# Patient Record
Sex: FEMALE | URBAN - METROPOLITAN AREA
[De-identification: names, ages, dates, MRNs, and addresses within clinical notes are randomized per-mention and may not be internally consistent; named-entity substitution may affect disease eponyms.]

---

## 2017-03-23 ENCOUNTER — EMERGENCY (EMERGENCY)
Facility: HOSPITAL | Age: 42
LOS: 0 days | Discharge: HOME | End: 2017-03-23
Attending: EMERGENCY MEDICINE | Admitting: EMERGENCY MEDICINE
Payer: COMMERCIAL

## 2017-03-23 VITALS — HEIGHT: 60 IN | WEIGHT: 111.99 LBS

## 2017-03-23 VITALS
TEMPERATURE: 98 F | HEART RATE: 65 BPM | OXYGEN SATURATION: 100 % | SYSTOLIC BLOOD PRESSURE: 106 MMHG | DIASTOLIC BLOOD PRESSURE: 56 MMHG | RESPIRATION RATE: 16 BRPM

## 2017-03-23 DIAGNOSIS — R10.31 RIGHT LOWER QUADRANT PAIN: ICD-10-CM

## 2017-03-23 DIAGNOSIS — N83.202 UNSPECIFIED OVARIAN CYST, LEFT SIDE: ICD-10-CM

## 2017-03-23 DIAGNOSIS — N83.201 UNSPECIFIED OVARIAN CYST, RIGHT SIDE: ICD-10-CM

## 2017-03-23 LAB
ALBUMIN SERPL ELPH-MCNC: 4 G/DL — SIGNIFICANT CHANGE UP (ref 3.3–5)
ALP SERPL-CCNC: 78 U/L — SIGNIFICANT CHANGE UP (ref 40–120)
ALT FLD-CCNC: 23 U/L — SIGNIFICANT CHANGE UP (ref 12–78)
ANION GAP SERPL CALC-SCNC: 9 MMOL/L — SIGNIFICANT CHANGE UP (ref 5–17)
APPEARANCE UR: CLEAR — SIGNIFICANT CHANGE UP
AST SERPL-CCNC: 24 U/L — SIGNIFICANT CHANGE UP (ref 15–37)
BASOPHILS # BLD AUTO: 0.1 K/UL — SIGNIFICANT CHANGE UP (ref 0–0.2)
BASOPHILS NFR BLD AUTO: 1.3 % — SIGNIFICANT CHANGE UP (ref 0–2)
BILIRUB SERPL-MCNC: 0.2 MG/DL — SIGNIFICANT CHANGE UP (ref 0.2–1.2)
BILIRUB UR-MCNC: NEGATIVE — SIGNIFICANT CHANGE UP
BUN SERPL-MCNC: 10 MG/DL — SIGNIFICANT CHANGE UP (ref 7–23)
CALCIUM SERPL-MCNC: 8.5 MG/DL — SIGNIFICANT CHANGE UP (ref 8.5–10.1)
CHLORIDE SERPL-SCNC: 106 MMOL/L — SIGNIFICANT CHANGE UP (ref 96–108)
CO2 SERPL-SCNC: 26 MMOL/L — SIGNIFICANT CHANGE UP (ref 22–31)
COLOR SPEC: YELLOW — SIGNIFICANT CHANGE UP
CREAT SERPL-MCNC: 0.65 MG/DL — SIGNIFICANT CHANGE UP (ref 0.5–1.3)
DIFF PNL FLD: NEGATIVE — SIGNIFICANT CHANGE UP
EOSINOPHIL # BLD AUTO: 0.3 K/UL — SIGNIFICANT CHANGE UP (ref 0–0.5)
EOSINOPHIL NFR BLD AUTO: 3.6 % — SIGNIFICANT CHANGE UP (ref 0–6)
GLUCOSE SERPL-MCNC: 84 MG/DL — SIGNIFICANT CHANGE UP (ref 70–99)
GLUCOSE UR QL: NEGATIVE MG/DL — SIGNIFICANT CHANGE UP
HCG SERPL-ACNC: <1 MIU/ML — SIGNIFICANT CHANGE UP
HCT VFR BLD CALC: 40.3 % — SIGNIFICANT CHANGE UP (ref 34.5–45)
HGB BLD-MCNC: 13.5 G/DL — SIGNIFICANT CHANGE UP (ref 11.5–15.5)
KETONES UR-MCNC: NEGATIVE — SIGNIFICANT CHANGE UP
LEUKOCYTE ESTERASE UR-ACNC: NEGATIVE — SIGNIFICANT CHANGE UP
LIDOCAIN IGE QN: 156 U/L — SIGNIFICANT CHANGE UP (ref 73–393)
LYMPHOCYTES # BLD AUTO: 2.4 K/UL — SIGNIFICANT CHANGE UP (ref 1–3.3)
LYMPHOCYTES # BLD AUTO: 30.2 % — SIGNIFICANT CHANGE UP (ref 13–44)
MCHC RBC-ENTMCNC: 29.9 PG — SIGNIFICANT CHANGE UP (ref 27–34)
MCHC RBC-ENTMCNC: 33.5 GM/DL — SIGNIFICANT CHANGE UP (ref 32–36)
MCV RBC AUTO: 89.4 FL — SIGNIFICANT CHANGE UP (ref 80–100)
MONOCYTES # BLD AUTO: 0.6 K/UL — SIGNIFICANT CHANGE UP (ref 0–0.9)
MONOCYTES NFR BLD AUTO: 8 % — SIGNIFICANT CHANGE UP (ref 2–14)
NEUTROPHILS # BLD AUTO: 4.5 K/UL — SIGNIFICANT CHANGE UP (ref 1.8–7.4)
NEUTROPHILS NFR BLD AUTO: 56.9 % — SIGNIFICANT CHANGE UP (ref 43–77)
NITRITE UR-MCNC: NEGATIVE — SIGNIFICANT CHANGE UP
PH UR: 8 — SIGNIFICANT CHANGE UP (ref 4.8–8)
PLATELET # BLD AUTO: 352 K/UL — SIGNIFICANT CHANGE UP (ref 150–400)
POTASSIUM SERPL-MCNC: 4.1 MMOL/L — SIGNIFICANT CHANGE UP (ref 3.5–5.3)
POTASSIUM SERPL-SCNC: 4.1 MMOL/L — SIGNIFICANT CHANGE UP (ref 3.5–5.3)
PROT SERPL-MCNC: 7.3 GM/DL — SIGNIFICANT CHANGE UP (ref 6–8.3)
PROT UR-MCNC: NEGATIVE MG/DL — SIGNIFICANT CHANGE UP
RBC # BLD: 4.51 M/UL — SIGNIFICANT CHANGE UP (ref 3.8–5.2)
RBC # FLD: 12.3 % — SIGNIFICANT CHANGE UP (ref 10.3–14.5)
SODIUM SERPL-SCNC: 141 MMOL/L — SIGNIFICANT CHANGE UP (ref 135–145)
SP GR SPEC: 1.01 — SIGNIFICANT CHANGE UP (ref 1.01–1.02)
UROBILINOGEN FLD QL: NEGATIVE MG/DL — SIGNIFICANT CHANGE UP
WBC # BLD: 7.8 K/UL — SIGNIFICANT CHANGE UP (ref 3.8–10.5)
WBC # FLD AUTO: 7.8 K/UL — SIGNIFICANT CHANGE UP (ref 3.8–10.5)

## 2017-03-23 PROCEDURE — 74177 CT ABD & PELVIS W/CONTRAST: CPT | Mod: 26

## 2017-03-23 PROCEDURE — 76830 TRANSVAGINAL US NON-OB: CPT | Mod: 26

## 2017-03-23 PROCEDURE — 99284 EMERGENCY DEPT VISIT MOD MDM: CPT

## 2017-03-23 RX ORDER — SODIUM CHLORIDE 9 MG/ML
1000 INJECTION INTRAMUSCULAR; INTRAVENOUS; SUBCUTANEOUS ONCE
Qty: 0 | Refills: 0 | Status: COMPLETED | OUTPATIENT
Start: 2017-03-23 | End: 2017-03-23

## 2017-03-23 RX ADMIN — SODIUM CHLORIDE 1000 MILLILITER(S): 9 INJECTION INTRAMUSCULAR; INTRAVENOUS; SUBCUTANEOUS at 16:23

## 2017-03-23 NOTE — ED STATDOCS - OBJECTIVE STATEMENT
40 y/o F with a PMHx hysterectomy presents to the ED c/o RLQ pain that started 2 days ago and is worse today. Pt states that she has been feeling tired more often, currently calm and denies NVD, EtOH, smoking, CP, SOB, HA, fever or any other acute c/o at this time.

## 2017-03-23 NOTE — ED STATDOCS - CARE PLAN
Principal Discharge DX:	Cysts of both ovaries  Secondary Diagnosis:	Abdominal pain, unspecified location

## 2017-03-23 NOTE — ED STATDOCS - ATTENDING CONTRIBUTION TO CARE
I, Elvira Martinez MD,  performed the initial face to face bedside interview with this patient regarding history of present illness, review of symptoms and relevant past medical, social and family history.  I completed an independent physical examination.  I was the initial provider who evaluated this patient. I have signed out the follow up of any pending tests (i.e. labs, radiological studies) to the ACP.  I have communicated the patient’s plan of care and disposition with the ACP.  The history, relevant review of systems, past medical and surgical history, medical decision making, and physical examination was documented by the scribe in my presence and I attest to the accuracy of the documentation.

## 2017-03-23 NOTE — ED STATDOCS - PROGRESS NOTE DETAILS
41 yr. old female presents to ED with RLQ pain onset 2 days ago worsened today 41 yr. old female presents to ED with RLQ pain onset 2 days ago worsened today radiates to back 41 yr. old female presents to ED with RLQ pain onset 2 days ago worsened today radiates to back, dysuria No fever or chills. No nausea or vomiting. No diarrhea. HEENT PERRLA EOMS intact, Neck; non tender no lymphadenopathy, Chest; non tender Lungs clear, Abd: tender RLQ  BS +   Plan: IV ,fluids, Labs CT abd./pelvis , UA. Will F/U with Labs and CT results and re evaluate. Brock ALONZO. Ultrasound shws good flow to both ovaries. Will d/c pt. - Jefferson Keenan PA-C

## 2017-03-23 NOTE — ED STATDOCS - NS ED MD SCRIBE ATTENDING SCRIBE SECTIONS
HISTORY OF PRESENT ILLNESS/DISPOSITION/PAST MEDICAL/SURGICAL/SOCIAL HISTORY/PHYSICAL EXAM/REVIEW OF SYSTEMS/RESULTS/PROGRESS NOTE

## 2017-03-27 ENCOUNTER — APPOINTMENT (OUTPATIENT)
Dept: OBGYN | Facility: CLINIC | Age: 42
End: 2017-03-27

## 2017-03-27 VITALS
DIASTOLIC BLOOD PRESSURE: 60 MMHG | WEIGHT: 156 LBS | HEIGHT: 63 IN | SYSTOLIC BLOOD PRESSURE: 100 MMHG | BODY MASS INDEX: 27.64 KG/M2

## 2017-03-27 DIAGNOSIS — N83.299 OTHER OVARIAN CYST, UNSPECIFIED SIDE: ICD-10-CM

## 2017-03-27 LAB
ESTRADIOL SERPL-MCNC: 75 PG/ML
FSH SERPL-MCNC: 7.3 IU/L

## 2017-03-28 LAB
CANDIDA VAG CYTO: NOT DETECTED
G VAGINALIS+PREV SP MTYP VAG QL MICRO: NOT DETECTED
T VAGINALIS VAG QL WET PREP: NOT DETECTED

## 2017-03-29 LAB
C TRACH RRNA SPEC QL NAA+PROBE: NORMAL
N GONORRHOEA RRNA SPEC QL NAA+PROBE: NORMAL
SOURCE AMPLIFICATION: NORMAL

## 2017-04-09 ENCOUNTER — FORM ENCOUNTER (OUTPATIENT)
Age: 42
End: 2017-04-09

## 2017-04-10 ENCOUNTER — APPOINTMENT (OUTPATIENT)
Dept: ULTRASOUND IMAGING | Facility: CLINIC | Age: 42
End: 2017-04-10

## 2017-04-10 ENCOUNTER — OUTPATIENT (OUTPATIENT)
Dept: OUTPATIENT SERVICES | Facility: HOSPITAL | Age: 42
LOS: 1 days | End: 2017-04-10
Payer: COMMERCIAL

## 2017-04-10 DIAGNOSIS — Z00.8 ENCOUNTER FOR OTHER GENERAL EXAMINATION: ICD-10-CM

## 2017-04-10 PROCEDURE — 76830 TRANSVAGINAL US NON-OB: CPT

## 2017-04-10 PROCEDURE — 76856 US EXAM PELVIC COMPLETE: CPT

## 2017-04-24 ENCOUNTER — APPOINTMENT (OUTPATIENT)
Dept: OBGYN | Facility: CLINIC | Age: 42
End: 2017-04-24

## 2017-05-08 ENCOUNTER — APPOINTMENT (OUTPATIENT)
Dept: OBGYN | Facility: CLINIC | Age: 42
End: 2017-05-08

## 2018-04-16 ENCOUNTER — APPOINTMENT (OUTPATIENT)
Dept: OBGYN | Facility: CLINIC | Age: 43
End: 2018-04-16
Payer: COMMERCIAL

## 2018-04-16 VITALS
SYSTOLIC BLOOD PRESSURE: 100 MMHG | WEIGHT: 156 LBS | DIASTOLIC BLOOD PRESSURE: 68 MMHG | BODY MASS INDEX: 27.64 KG/M2 | HEIGHT: 63 IN

## 2018-04-16 DIAGNOSIS — Z01.419 ENCOUNTER FOR GYNECOLOGICAL EXAMINATION (GENERAL) (ROUTINE) W/OUT ABNORMAL FINDINGS: ICD-10-CM

## 2018-04-16 DIAGNOSIS — Z87.42 PERSONAL HISTORY OF OTHER DISEASES OF THE FEMALE GENITAL TRACT: ICD-10-CM

## 2018-04-16 DIAGNOSIS — Z86.018 PERSONAL HISTORY OF OTHER BENIGN NEOPLASM: ICD-10-CM

## 2018-04-16 PROCEDURE — 99396 PREV VISIT EST AGE 40-64: CPT

## 2018-04-17 PROBLEM — Z86.018 HISTORY OF UTERINE LEIOMYOMA: Status: RESOLVED | Noted: 2018-04-17 | Resolved: 2018-04-17

## 2018-04-17 PROBLEM — Z87.42 HISTORY OF OVARIAN CYST: Status: RESOLVED | Noted: 2018-04-17 | Resolved: 2018-04-17

## 2018-04-18 LAB
APPEARANCE: CLEAR
BACTERIA UR CULT: NORMAL
BILIRUBIN URINE: NEGATIVE
BLOOD URINE: NEGATIVE
COLOR: YELLOW
GLUCOSE QUALITATIVE U: NEGATIVE MG/DL
HPV HIGH+LOW RISK DNA PNL CVX: NOT DETECTED
KETONES URINE: NEGATIVE
LEUKOCYTE ESTERASE URINE: NEGATIVE
NITRITE URINE: NEGATIVE
PH URINE: 6.5
PROTEIN URINE: NEGATIVE MG/DL
SPECIFIC GRAVITY URINE: 1.01
UROBILINOGEN URINE: NEGATIVE MG/DL

## 2018-04-19 LAB — CYTOLOGY CVX/VAG DOC THIN PREP: NORMAL

## 2018-05-14 ENCOUNTER — APPOINTMENT (OUTPATIENT)
Dept: OBGYN | Facility: CLINIC | Age: 43
End: 2018-05-14
Payer: COMMERCIAL

## 2018-05-14 VITALS
SYSTOLIC BLOOD PRESSURE: 108 MMHG | HEIGHT: 63 IN | BODY MASS INDEX: 27.64 KG/M2 | DIASTOLIC BLOOD PRESSURE: 70 MMHG | WEIGHT: 156 LBS

## 2018-05-14 DIAGNOSIS — N83.202 UNSPECIFIED OVARIAN CYST, RIGHT SIDE: ICD-10-CM

## 2018-05-14 DIAGNOSIS — N83.201 UNSPECIFIED OVARIAN CYST, RIGHT SIDE: ICD-10-CM

## 2018-05-14 PROCEDURE — 99213 OFFICE O/P EST LOW 20 MIN: CPT

## 2018-07-22 ENCOUNTER — EMERGENCY (EMERGENCY)
Facility: HOSPITAL | Age: 43
LOS: 0 days | Discharge: HOME | End: 2018-07-22
Attending: STUDENT IN AN ORGANIZED HEALTH CARE EDUCATION/TRAINING PROGRAM | Admitting: STUDENT IN AN ORGANIZED HEALTH CARE EDUCATION/TRAINING PROGRAM
Payer: COMMERCIAL

## 2018-07-22 VITALS
DIASTOLIC BLOOD PRESSURE: 42 MMHG | HEART RATE: 74 BPM | OXYGEN SATURATION: 99 % | TEMPERATURE: 98 F | SYSTOLIC BLOOD PRESSURE: 116 MMHG | RESPIRATION RATE: 16 BRPM

## 2018-07-22 VITALS — WEIGHT: 153 LBS

## 2018-07-22 DIAGNOSIS — Z90.710 ACQUIRED ABSENCE OF BOTH CERVIX AND UTERUS: Chronic | ICD-10-CM

## 2018-07-22 DIAGNOSIS — R51 HEADACHE: ICD-10-CM

## 2018-07-22 DIAGNOSIS — R07.9 CHEST PAIN, UNSPECIFIED: ICD-10-CM

## 2018-07-22 DIAGNOSIS — R06.02 SHORTNESS OF BREATH: ICD-10-CM

## 2018-07-22 DIAGNOSIS — Z98.890 OTHER SPECIFIED POSTPROCEDURAL STATES: ICD-10-CM

## 2018-07-22 DIAGNOSIS — Z90.710 ACQUIRED ABSENCE OF BOTH CERVIX AND UTERUS: ICD-10-CM

## 2018-07-22 LAB
ALBUMIN SERPL ELPH-MCNC: 3.9 G/DL — SIGNIFICANT CHANGE UP (ref 3.3–5)
ALP SERPL-CCNC: 74 U/L — SIGNIFICANT CHANGE UP (ref 40–120)
ALT FLD-CCNC: 32 U/L — SIGNIFICANT CHANGE UP (ref 12–78)
ANION GAP SERPL CALC-SCNC: 6 MMOL/L — SIGNIFICANT CHANGE UP (ref 5–17)
AST SERPL-CCNC: 22 U/L — SIGNIFICANT CHANGE UP (ref 15–37)
BASOPHILS # BLD AUTO: 0.05 K/UL — SIGNIFICANT CHANGE UP (ref 0–0.2)
BASOPHILS NFR BLD AUTO: 0.9 % — SIGNIFICANT CHANGE UP (ref 0–2)
BILIRUB SERPL-MCNC: 0.4 MG/DL — SIGNIFICANT CHANGE UP (ref 0.2–1.2)
BUN SERPL-MCNC: 10 MG/DL — SIGNIFICANT CHANGE UP (ref 7–23)
CALCIUM SERPL-MCNC: 8.6 MG/DL — SIGNIFICANT CHANGE UP (ref 8.5–10.1)
CHLORIDE SERPL-SCNC: 108 MMOL/L — SIGNIFICANT CHANGE UP (ref 96–108)
CK SERPL-CCNC: 121 U/L — SIGNIFICANT CHANGE UP (ref 26–192)
CO2 SERPL-SCNC: 27 MMOL/L — SIGNIFICANT CHANGE UP (ref 22–31)
CREAT SERPL-MCNC: 0.8 MG/DL — SIGNIFICANT CHANGE UP (ref 0.5–1.3)
EOSINOPHIL # BLD AUTO: 0.18 K/UL — SIGNIFICANT CHANGE UP (ref 0–0.5)
EOSINOPHIL NFR BLD AUTO: 3.3 % — SIGNIFICANT CHANGE UP (ref 0–6)
GLUCOSE SERPL-MCNC: 95 MG/DL — SIGNIFICANT CHANGE UP (ref 70–99)
HCT VFR BLD CALC: 40.8 % — SIGNIFICANT CHANGE UP (ref 34.5–45)
HGB BLD-MCNC: 13.6 G/DL — SIGNIFICANT CHANGE UP (ref 11.5–15.5)
IMM GRANULOCYTES NFR BLD AUTO: 0.7 % — SIGNIFICANT CHANGE UP (ref 0–1.5)
LIDOCAIN IGE QN: 149 U/L — SIGNIFICANT CHANGE UP (ref 73–393)
LYMPHOCYTES # BLD AUTO: 1.76 K/UL — SIGNIFICANT CHANGE UP (ref 1–3.3)
LYMPHOCYTES # BLD AUTO: 32.4 % — SIGNIFICANT CHANGE UP (ref 13–44)
MCHC RBC-ENTMCNC: 29.8 PG — SIGNIFICANT CHANGE UP (ref 27–34)
MCHC RBC-ENTMCNC: 33.3 GM/DL — SIGNIFICANT CHANGE UP (ref 32–36)
MCV RBC AUTO: 89.3 FL — SIGNIFICANT CHANGE UP (ref 80–100)
MONOCYTES # BLD AUTO: 0.41 K/UL — SIGNIFICANT CHANGE UP (ref 0–0.9)
MONOCYTES NFR BLD AUTO: 7.5 % — SIGNIFICANT CHANGE UP (ref 2–14)
NEUTROPHILS # BLD AUTO: 3 K/UL — SIGNIFICANT CHANGE UP (ref 1.8–7.4)
NEUTROPHILS NFR BLD AUTO: 55.2 % — SIGNIFICANT CHANGE UP (ref 43–77)
NRBC # BLD: 0 /100 WBCS — SIGNIFICANT CHANGE UP (ref 0–0)
PLATELET # BLD AUTO: 298 K/UL — SIGNIFICANT CHANGE UP (ref 150–400)
POTASSIUM SERPL-MCNC: 4 MMOL/L — SIGNIFICANT CHANGE UP (ref 3.5–5.3)
POTASSIUM SERPL-SCNC: 4 MMOL/L — SIGNIFICANT CHANGE UP (ref 3.5–5.3)
PROT SERPL-MCNC: 7.2 GM/DL — SIGNIFICANT CHANGE UP (ref 6–8.3)
RBC # BLD: 4.57 M/UL — SIGNIFICANT CHANGE UP (ref 3.8–5.2)
RBC # FLD: 12.8 % — SIGNIFICANT CHANGE UP (ref 10.3–14.5)
SODIUM SERPL-SCNC: 141 MMOL/L — SIGNIFICANT CHANGE UP (ref 135–145)
TROPONIN I SERPL-MCNC: <0.015 NG/ML — SIGNIFICANT CHANGE UP (ref 0.01–0.04)
TROPONIN I SERPL-MCNC: <0.015 NG/ML — SIGNIFICANT CHANGE UP (ref 0.01–0.04)
WBC # BLD: 5.44 K/UL — SIGNIFICANT CHANGE UP (ref 3.8–10.5)
WBC # FLD AUTO: 5.44 K/UL — SIGNIFICANT CHANGE UP (ref 3.8–10.5)

## 2018-07-22 PROCEDURE — 71046 X-RAY EXAM CHEST 2 VIEWS: CPT | Mod: 26

## 2018-07-22 PROCEDURE — 93010 ELECTROCARDIOGRAM REPORT: CPT

## 2018-07-22 PROCEDURE — 99284 EMERGENCY DEPT VISIT MOD MDM: CPT

## 2018-07-22 RX ORDER — ASPIRIN/CALCIUM CARB/MAGNESIUM 324 MG
325 TABLET ORAL ONCE
Qty: 0 | Refills: 0 | Status: COMPLETED | OUTPATIENT
Start: 2018-07-22 | End: 2018-07-22

## 2018-07-22 RX ORDER — SODIUM CHLORIDE 9 MG/ML
3 INJECTION INTRAMUSCULAR; INTRAVENOUS; SUBCUTANEOUS ONCE
Qty: 0 | Refills: 0 | Status: COMPLETED | OUTPATIENT
Start: 2018-07-22 | End: 2018-07-22

## 2018-07-22 RX ADMIN — SODIUM CHLORIDE 3 MILLILITER(S): 9 INJECTION INTRAMUSCULAR; INTRAVENOUS; SUBCUTANEOUS at 12:29

## 2018-07-22 RX ADMIN — Medication 325 MILLIGRAM(S): at 12:30

## 2018-07-22 NOTE — ED STATDOCS - PROGRESS NOTE DETAILS
43 yr. old female PMH: Hysterectomy presents to ED with intermittent left chest pain and SOB onset 3 days ago worse on taking a deep breath. Also has headache. Non smoker no recent travel not on any hormones. Seen and examined by attending in Intake. Plan: IV, Labs, Trop Xs 2, Chest X-Ray, EKG.  Will F/U with results and re evaluate. Brock ALONZO Kehinde DO: Patient feels better at this time; 2 troponins negative; instructed to f/u with pmd, cards in 1-2 days without fail; strict return precautions given.

## 2018-07-22 NOTE — ED STATDOCS - OBJECTIVE STATEMENT
44 y/o female with a PMHx of hysterectomy presents to the ED c/o chest pain. Pt's daughter at bedside states symptoms started 3 days ago with SOB and CP that radiates to neck. Pt also reports HA. Denies history of similar symptoms in the past. Symptoms are intermittent. At time of eval, pt reports pressure in chest, fatigue, and HA. Pt has history of migraines but they are becoming more frequent and are beginning to cause blurry vision as well. Took ibuprofen last night for symptoms without relief. Denies hormone/estrogen prescriptions at this time. No smoking, no FHx of heart disease. 42 y/o female with a PMHx of hysterectomy presents to the ED c/o chest pain. Pt's daughter at bedside states symptoms started 3 days ago with SOB and CP that radiates to neck. Pt also reports HA. Denies history of similar symptoms in the past. Symptoms are intermittent. At time of eval, pt reports pressure in chest, fatigue, and HA. Pt has history of migraines ; Took ibuprofen last night for symptoms without relief. Denies hormone/estrogen prescriptions at this time. No smoking, no FHx of heart disease.

## 2018-07-22 NOTE — ED STATDOCS - ATTENDING CONTRIBUTION TO CARE
I, Jessica Busby DO,  performed the initial face to face bedside interview with this patient regarding history of present illness, review of symptoms and relevant past medical, social and family history.  I completed an independent physical examination.  I was the initial provider who evaluated this patient. I have signed out the follow up of any pending tests (i.e. labs, radiological studies) to the ACP.  I have communicated the patient’s plan of care and disposition with the ACP.  The history, relevant review of systems, past medical and surgical history, medical decision making, and physical examination was documented by the scribe in my presence and I attest to the accuracy of the documentation.

## 2019-02-21 ENCOUNTER — APPOINTMENT (OUTPATIENT)
Dept: OBGYN | Facility: CLINIC | Age: 44
End: 2019-02-21
Payer: COMMERCIAL

## 2019-02-21 PROCEDURE — 99214 OFFICE O/P EST MOD 30 MIN: CPT

## 2019-02-22 NOTE — PHYSICAL EXAM
[Soft, Nontender] : the abdomen was soft and nontender [Firm] : not firm [Rigid] : not rigid [Rebound] : no rebound [Guarding] : no guarding [Oriented x3] : oriented to person, place, and time [Depressed Mood] : not depressed [Normal] : external genitalia [Discharge] : no discharge [Absent] : absent [Adnexa Tenderness] : were not tender

## 2019-02-24 ENCOUNTER — FORM ENCOUNTER (OUTPATIENT)
Age: 44
End: 2019-02-24

## 2019-02-25 ENCOUNTER — OUTPATIENT (OUTPATIENT)
Dept: OUTPATIENT SERVICES | Facility: HOSPITAL | Age: 44
LOS: 1 days | End: 2019-02-25
Payer: COMMERCIAL

## 2019-02-25 ENCOUNTER — APPOINTMENT (OUTPATIENT)
Dept: ULTRASOUND IMAGING | Facility: CLINIC | Age: 44
End: 2019-02-25
Payer: COMMERCIAL

## 2019-02-25 DIAGNOSIS — Z00.8 ENCOUNTER FOR OTHER GENERAL EXAMINATION: ICD-10-CM

## 2019-02-25 DIAGNOSIS — Z90.710 ACQUIRED ABSENCE OF BOTH CERVIX AND UTERUS: Chronic | ICD-10-CM

## 2019-02-25 LAB
APPEARANCE: CLEAR
BACTERIA UR CULT: NORMAL
BACTERIA: NEGATIVE
BILIRUBIN URINE: NEGATIVE
BLOOD URINE: NEGATIVE
CANDIDA VAG CYTO: NOT DETECTED
COLOR: COLORLESS
G VAGINALIS+PREV SP MTYP VAG QL MICRO: NOT DETECTED
GLUCOSE QUALITATIVE U: NEGATIVE
HYALINE CASTS: 1 /LPF
KETONES URINE: NEGATIVE
LEUKOCYTE ESTERASE URINE: NEGATIVE
MICROSCOPIC-UA: NORMAL
NITRITE URINE: NEGATIVE
PH URINE: 7.5
PROTEIN URINE: NEGATIVE
RED BLOOD CELLS URINE: 8 /HPF
SPECIFIC GRAVITY URINE: 1.01
SQUAMOUS EPITHELIAL CELLS: 3 /HPF
T VAGINALIS VAG QL WET PREP: NOT DETECTED
UROBILINOGEN URINE: NORMAL
WHITE BLOOD CELLS URINE: 0 /HPF

## 2019-02-25 PROCEDURE — 76830 TRANSVAGINAL US NON-OB: CPT

## 2019-02-25 PROCEDURE — 76856 US EXAM PELVIC COMPLETE: CPT | Mod: 26

## 2019-02-25 PROCEDURE — 76856 US EXAM PELVIC COMPLETE: CPT

## 2019-02-25 PROCEDURE — 76830 TRANSVAGINAL US NON-OB: CPT | Mod: 26

## 2019-02-26 ENCOUNTER — RESULT REVIEW (OUTPATIENT)
Age: 44
End: 2019-02-26

## 2019-02-28 ENCOUNTER — CLINICAL ADVICE (OUTPATIENT)
Age: 44
End: 2019-02-28

## 2019-03-14 ENCOUNTER — APPOINTMENT (OUTPATIENT)
Dept: NEUROLOGY | Facility: CLINIC | Age: 44
End: 2019-03-14
Payer: COMMERCIAL

## 2019-03-14 VITALS
SYSTOLIC BLOOD PRESSURE: 108 MMHG | BODY MASS INDEX: 27.64 KG/M2 | DIASTOLIC BLOOD PRESSURE: 68 MMHG | HEIGHT: 63 IN | WEIGHT: 156 LBS | HEART RATE: 73 BPM

## 2019-03-14 DIAGNOSIS — Z86.69 PERSONAL HISTORY OF OTHER DISEASES OF THE NERVOUS SYSTEM AND SENSE ORGANS: ICD-10-CM

## 2019-03-14 DIAGNOSIS — S16.1XXA STRAIN OF MUSCLE, FASCIA AND TENDON AT NECK LEVEL, INITIAL ENCOUNTER: ICD-10-CM

## 2019-03-14 PROCEDURE — 99244 OFF/OP CNSLTJ NEW/EST MOD 40: CPT

## 2019-03-14 NOTE — DISCUSSION/SUMMARY
[FreeTextEntry1] : 43-year-old female with PMH remarkable for arthritis and headaches since past 15 years; presents with increasing frequency of headaches associated with periorbital pain, neck pain and occasional dizziness.\par \par # Migraine headaches without aura, not intractable; frequency - 10 per month \par \par - Have advised patient to maintain a headache log, note to triggers frequency and pattern.\par - Patient unable to take NSAIDs as she has developed gastric irritation; since headache frequency is 10 per month, she may benefit from prophylactic therapy, patient is willing to try nortriptyline 10 mg h.s., adverse effects discussed with the patient\par - I advised patient regarding lifestyle change, proper eating habits, avoid dehydration and sleep deprivation\par - If needed. later may consider MRI brain, at this time not deemed necessary\par \par # Cervical strain/cervicalgia\par \par - Start physical therapy for the neck\par \par # Positive SOTO and arthritis\par \par - Follow up with Dr. Johnson, the rheumatologist\par \par

## 2019-03-14 NOTE — PHYSICAL EXAM
[General Appearance - Alert] : alert [General Appearance - In No Acute Distress] : in no acute distress [Oriented To Time, Place, And Person] : oriented to person, place, and time [Impaired Insight] : insight and judgment were intact [Affect] : the affect was normal [Person] : oriented to person [Place] : oriented to place [Time] : oriented to time [Concentration Intact] : normal concentrating ability [Visual Intact] : visual attention was ~T not ~L decreased [Naming Objects] : no difficulty naming common objects [Repeating Phrases] : no difficulty repeating a phrase [Writing A Sentence] : no difficulty writing a sentence [Fluency] : fluency intact [Comprehension] : comprehension intact [Reading] : reading intact [Past History] : adequate knowledge of personal past history [Cranial Nerves Optic (II)] : visual acuity intact bilaterally,  visual fields full to confrontation, pupils equal round and reactive to light [Cranial Nerves Oculomotor (III)] : extraocular motion intact [Cranial Nerves Trigeminal (V)] : facial sensation intact symmetrically [Cranial Nerves Facial (VII)] : face symmetrical [Cranial Nerves Vestibulocochlear (VIII)] : hearing was intact bilaterally [Cranial Nerves Glossopharyngeal (IX)] : tongue and palate midline [Cranial Nerves Accessory (XI - Cranial And Spinal)] : head turning and shoulder shrug symmetric [Cranial Nerves Hypoglossal (XII)] : there was no tongue deviation with protrusion [Motor Tone] : muscle tone was normal in all four extremities [Motor Strength] : muscle strength was normal in all four extremities [No Muscle Atrophy] : normal bulk in all four extremities [Sensation Tactile Decrease] : light touch was intact [Proprioception] : proprioception was intact [Abnormal Walk] : normal gait [Balance] : balance was intact [2+] : Ankle jerk left 2+ [PERRL With Normal Accommodation] : pupils were equal in size, round, reactive to light, with normal accommodation [Extraocular Movements] : extraocular movements were intact [Full Visual Field] : full visual field [Outer Ear] : the ears and nose were normal in appearance [Hearing Threshold Finger Rub Not [place]] : hearing was normal [Auscultation Breath Sounds / Voice Sounds] : lungs were clear to auscultation bilaterally [Heart Rate And Rhythm] : heart rate was normal and rhythm regular [Heart Sounds] : normal S1 and S2 [Arterial Pulses Carotid] : carotid pulses were normal with no bruits [Edema] : there was no peripheral edema [No Spinal Tenderness] : no spinal tenderness [Involuntary Movements] : no involuntary movements were seen [] : no rash [Past-pointing] : there was no past-pointing [Tremor] : no tremor present [Plantar Reflex Right Only] : normal on the right [Plantar Reflex Left Only] : normal on the left [FreeTextEntry1] : arthritis small joints of hands

## 2019-03-14 NOTE — PHYSICAL EXAM
[General Appearance - Alert] : alert [General Appearance - In No Acute Distress] : in no acute distress [Oriented To Time, Place, And Person] : oriented to person, place, and time [Impaired Insight] : insight and judgment were intact [Affect] : the affect was normal [Person] : oriented to person [Place] : oriented to place [Time] : oriented to time [Concentration Intact] : normal concentrating ability [Visual Intact] : visual attention was ~T not ~L decreased [Naming Objects] : no difficulty naming common objects [Repeating Phrases] : no difficulty repeating a phrase [Writing A Sentence] : no difficulty writing a sentence [Fluency] : fluency intact [Comprehension] : comprehension intact [Reading] : reading intact [Past History] : adequate knowledge of personal past history [Cranial Nerves Optic (II)] : visual acuity intact bilaterally,  visual fields full to confrontation, pupils equal round and reactive to light [Cranial Nerves Oculomotor (III)] : extraocular motion intact [Cranial Nerves Trigeminal (V)] : facial sensation intact symmetrically [Cranial Nerves Facial (VII)] : face symmetrical [Cranial Nerves Vestibulocochlear (VIII)] : hearing was intact bilaterally [Cranial Nerves Glossopharyngeal (IX)] : tongue and palate midline [Cranial Nerves Accessory (XI - Cranial And Spinal)] : head turning and shoulder shrug symmetric [Cranial Nerves Hypoglossal (XII)] : there was no tongue deviation with protrusion [Motor Tone] : muscle tone was normal in all four extremities [Motor Strength] : muscle strength was normal in all four extremities [No Muscle Atrophy] : normal bulk in all four extremities [Sensation Tactile Decrease] : light touch was intact [Proprioception] : proprioception was intact [Abnormal Walk] : normal gait [Balance] : balance was intact [2+] : Ankle jerk left 2+ [PERRL With Normal Accommodation] : pupils were equal in size, round, reactive to light, with normal accommodation [Extraocular Movements] : extraocular movements were intact [Full Visual Field] : full visual field [Outer Ear] : the ears and nose were normal in appearance [Hearing Threshold Finger Rub Not [place]] : hearing was normal [Auscultation Breath Sounds / Voice Sounds] : lungs were clear to auscultation bilaterally [Heart Rate And Rhythm] : heart rate was normal and rhythm regular [Heart Sounds] : normal S1 and S2 [Arterial Pulses Carotid] : carotid pulses were normal with no bruits [Edema] : there was no peripheral edema [No Spinal Tenderness] : no spinal tenderness [Involuntary Movements] : no involuntary movements were seen [] : no rash [Past-pointing] : there was no past-pointing [Tremor] : no tremor present [Plantar Reflex Right Only] : normal on the right [Plantar Reflex Left Only] : normal on the left [FreeTextEntry1] : arthritis small joints of hands

## 2019-03-14 NOTE — REVIEW OF SYSTEMS
[As Noted in HPI] : as noted in HPI [Migraine Headache] : migraine headaches [Heartburn] : heartburn [Negative] : Heme/Lymph [FreeTextEntry9] : neck pain

## 2019-03-14 NOTE — HISTORY OF PRESENT ILLNESS
[FreeTextEntry1] : 43-year-old female with past medical history remarkable for arthritis and headaches; presents today for evaluation of increasing frequency of headaches associated with periorbital pain.\par \par Patient states that she started experiencing headaches about 15 years ago, headaches are frontal and suboccipital in location, usually associated with photophobia, mild nausea and throbbing character, every now and then headaches are associated with severe perioperative pain. Patient states that the usual duration of headaches is few hours to a day, headaches usually start in the mornings, at times wake her up at night from sleep.\par \par Headaches were usually associated with menstrual periods to start with, patient underwent hysterectomy 6 years ago, since then headaches have been coming at random; at times headaches come daily  for 6-7 days, at times to 3 days ago, most recently she had headache that lasted a whole week; more recently the headache frequency is 10 per month. Patient denies visual aura, associated paresthesias/vertigo or tinnitus, she does admit every once in a while she feels slightly dizzy. \par \par The patient has been using NSAIDs to obtain relief, she was started on Gabapentin by P.M.D., she has been taking 100 mg sporadically. She also had blood work done, SOTO was elevated, she was referred to a rheumatologist Dr. Johnson, she has a followup appointment soon, the patient has complaints of diffuse joint pains including swelling and pain in the small joints of hands.\par \par

## 2019-03-14 NOTE — REASON FOR VISIT
[Consultation] : a consultation visit [FreeTextEntry1] : Referred by Dr. Hermosillo for evaluation of headaches

## 2019-03-19 ENCOUNTER — EMERGENCY (EMERGENCY)
Facility: HOSPITAL | Age: 44
LOS: 0 days | Discharge: HOME | End: 2019-03-19
Attending: EMERGENCY MEDICINE | Admitting: EMERGENCY MEDICINE
Payer: COMMERCIAL

## 2019-03-19 VITALS
OXYGEN SATURATION: 100 % | TEMPERATURE: 99 F | HEART RATE: 66 BPM | DIASTOLIC BLOOD PRESSURE: 47 MMHG | RESPIRATION RATE: 16 BRPM | SYSTOLIC BLOOD PRESSURE: 119 MMHG

## 2019-03-19 VITALS — HEIGHT: 62 IN | WEIGHT: 158.07 LBS

## 2019-03-19 DIAGNOSIS — Z90.710 ACQUIRED ABSENCE OF BOTH CERVIX AND UTERUS: Chronic | ICD-10-CM

## 2019-03-19 DIAGNOSIS — R07.9 CHEST PAIN, UNSPECIFIED: ICD-10-CM

## 2019-03-19 LAB
ALBUMIN SERPL ELPH-MCNC: 3.9 G/DL — SIGNIFICANT CHANGE UP (ref 3.3–5)
ALP SERPL-CCNC: 90 U/L — SIGNIFICANT CHANGE UP (ref 40–120)
ALT FLD-CCNC: 26 U/L — SIGNIFICANT CHANGE UP (ref 12–78)
ANION GAP SERPL CALC-SCNC: 8 MMOL/L — SIGNIFICANT CHANGE UP (ref 5–17)
AST SERPL-CCNC: 17 U/L — SIGNIFICANT CHANGE UP (ref 15–37)
BASOPHILS # BLD AUTO: 0.05 K/UL — SIGNIFICANT CHANGE UP (ref 0–0.2)
BASOPHILS NFR BLD AUTO: 0.6 % — SIGNIFICANT CHANGE UP (ref 0–2)
BILIRUB SERPL-MCNC: 0.3 MG/DL — SIGNIFICANT CHANGE UP (ref 0.2–1.2)
BUN SERPL-MCNC: 12 MG/DL — SIGNIFICANT CHANGE UP (ref 7–23)
CALCIUM SERPL-MCNC: 8.8 MG/DL — SIGNIFICANT CHANGE UP (ref 8.5–10.1)
CHLORIDE SERPL-SCNC: 106 MMOL/L — SIGNIFICANT CHANGE UP (ref 96–108)
CO2 SERPL-SCNC: 25 MMOL/L — SIGNIFICANT CHANGE UP (ref 22–31)
CREAT SERPL-MCNC: 0.9 MG/DL — SIGNIFICANT CHANGE UP (ref 0.5–1.3)
D DIMER BLD IA.RAPID-MCNC: <150 NG/ML DDU — SIGNIFICANT CHANGE UP
EOSINOPHIL # BLD AUTO: 0.14 K/UL — SIGNIFICANT CHANGE UP (ref 0–0.5)
EOSINOPHIL NFR BLD AUTO: 1.7 % — SIGNIFICANT CHANGE UP (ref 0–6)
GLUCOSE SERPL-MCNC: 78 MG/DL — SIGNIFICANT CHANGE UP (ref 70–99)
HCT VFR BLD CALC: 41.8 % — SIGNIFICANT CHANGE UP (ref 34.5–45)
HGB BLD-MCNC: 14.3 G/DL — SIGNIFICANT CHANGE UP (ref 11.5–15.5)
IMM GRANULOCYTES NFR BLD AUTO: 0.2 % — SIGNIFICANT CHANGE UP (ref 0–1.5)
LYMPHOCYTES # BLD AUTO: 2.19 K/UL — SIGNIFICANT CHANGE UP (ref 1–3.3)
LYMPHOCYTES # BLD AUTO: 26.4 % — SIGNIFICANT CHANGE UP (ref 13–44)
MCHC RBC-ENTMCNC: 30.2 PG — SIGNIFICANT CHANGE UP (ref 27–34)
MCHC RBC-ENTMCNC: 34.2 GM/DL — SIGNIFICANT CHANGE UP (ref 32–36)
MCV RBC AUTO: 88.4 FL — SIGNIFICANT CHANGE UP (ref 80–100)
MONOCYTES # BLD AUTO: 0.58 K/UL — SIGNIFICANT CHANGE UP (ref 0–0.9)
MONOCYTES NFR BLD AUTO: 7 % — SIGNIFICANT CHANGE UP (ref 2–14)
NEUTROPHILS # BLD AUTO: 5.32 K/UL — SIGNIFICANT CHANGE UP (ref 1.8–7.4)
NEUTROPHILS NFR BLD AUTO: 64.1 % — SIGNIFICANT CHANGE UP (ref 43–77)
NRBC # BLD: 0 /100 WBCS — SIGNIFICANT CHANGE UP (ref 0–0)
PLATELET # BLD AUTO: 323 K/UL — SIGNIFICANT CHANGE UP (ref 150–400)
POTASSIUM SERPL-MCNC: 3.8 MMOL/L — SIGNIFICANT CHANGE UP (ref 3.5–5.3)
POTASSIUM SERPL-SCNC: 3.8 MMOL/L — SIGNIFICANT CHANGE UP (ref 3.5–5.3)
PROT SERPL-MCNC: 7.6 GM/DL — SIGNIFICANT CHANGE UP (ref 6–8.3)
RBC # BLD: 4.73 M/UL — SIGNIFICANT CHANGE UP (ref 3.8–5.2)
RBC # FLD: 13 % — SIGNIFICANT CHANGE UP (ref 10.3–14.5)
SODIUM SERPL-SCNC: 139 MMOL/L — SIGNIFICANT CHANGE UP (ref 135–145)
TROPONIN I SERPL-MCNC: <0.015 NG/ML — SIGNIFICANT CHANGE UP (ref 0.01–0.04)
TROPONIN I SERPL-MCNC: <0.015 NG/ML — SIGNIFICANT CHANGE UP (ref 0.01–0.04)
WBC # BLD: 8.3 K/UL — SIGNIFICANT CHANGE UP (ref 3.8–10.5)
WBC # FLD AUTO: 8.3 K/UL — SIGNIFICANT CHANGE UP (ref 3.8–10.5)

## 2019-03-19 PROCEDURE — 93010 ELECTROCARDIOGRAM REPORT: CPT

## 2019-03-19 PROCEDURE — 71046 X-RAY EXAM CHEST 2 VIEWS: CPT | Mod: 26

## 2019-03-19 PROCEDURE — 99284 EMERGENCY DEPT VISIT MOD MDM: CPT

## 2019-03-19 NOTE — ED STATDOCS - OBJECTIVE STATEMENT
44 y/o female with no relevant PMHx presents to the ED from urgent care c/o sudden onset left sided CP yesterday, lasting half an hour. Pt drank water and CP resolved. Today pt had "left arm heaviness" and so came into ED. +SOB. Denies vomiting, diarrhea. Denies heavy lifting, injury, or trauma. Pt took ASA PTA. Denies tobacco use, EtOH use, illicit drug use. Pt does not get menstrual periods anymore because she had a hysterectomy in 2012. PMD- Dr. Hermosillo.  #[id number].

## 2019-03-19 NOTE — ED STATDOCS - PROGRESS NOTE DETAILS
42 y/o female with no relevant PMHx presents to the ED from urgent care c/o sudden onset left sided CP yesterday, lasting half an hour. Pt drank water and CP resolved. Today pt had "left arm heaviness" and so came into ED. +SOB. Denies vomiting, diarrhea. Denies heavy lifting, injury, or trauma. Pt took ASA PTA. Denies tobacco use, EtOH use, illicit drug use. -Jordan Hollis PA-C Results reviewed and discussed with pt. Pt with a heart score of 0, can be safely discharged at this time. Discussed importance of close FU with PMD.  Pt asked to return to ED immediately for any new or concerning sx or worsening sx. Pt acknowledges and understands plan. -Jordan Hollis PA-C

## 2019-03-19 NOTE — ED ADULT NURSE NOTE - DISCHARGE DATE/TIME
Medication Requested:    ALPRAZolam (XANAX) 0.5 MG tablet        Sig - Route: Take 0.5 mg by mouth every morning. - Oral    Class: Historical Med          Last Office Visit:  5/9/18    Next Office Visit:  6/12/18    Last Refill:  5/1/18- patient states she is taking one tablet by mouth in morning and one tablet by mouth in evening, medication routed to Dr. Crystal for approval      Medication refill request routed to provider for refill approval     19-Mar-2019 20:46

## 2019-03-19 NOTE — ED ADULT TRIAGE NOTE - CHIEF COMPLAINT QUOTE
pt sent from MD office for CP. Pt states chest pain started yesterday, now radiating down left arm, and feeling a burning sensation to left side of head. Denies Nausea, vomiting, dizziness.

## 2019-03-19 NOTE — ED STATDOCS - CLINICAL SUMMARY MEDICAL DECISION MAKING FREE TEXT BOX
44 y/o female with left sided CP Plan labs, x-ray. 44 y/o female with left sided CP Plan labs, x-ray.  Labs unremarkable, pt feeling well, stable for dc.

## 2019-06-04 ENCOUNTER — APPOINTMENT (OUTPATIENT)
Dept: NEUROLOGY | Facility: CLINIC | Age: 44
End: 2019-06-04

## 2019-08-26 ENCOUNTER — NON-APPOINTMENT (OUTPATIENT)
Age: 44
End: 2019-08-26

## 2019-08-26 ENCOUNTER — APPOINTMENT (OUTPATIENT)
Dept: FAMILY MEDICINE | Facility: CLINIC | Age: 44
End: 2019-08-26
Payer: COMMERCIAL

## 2019-08-26 VITALS
HEART RATE: 72 BPM | RESPIRATION RATE: 16 BRPM | SYSTOLIC BLOOD PRESSURE: 122 MMHG | HEIGHT: 63 IN | DIASTOLIC BLOOD PRESSURE: 70 MMHG | BODY MASS INDEX: 28.35 KG/M2 | WEIGHT: 160 LBS | OXYGEN SATURATION: 98 %

## 2019-08-26 DIAGNOSIS — K64.9 UNSPECIFIED HEMORRHOIDS: ICD-10-CM

## 2019-08-26 PROCEDURE — 93000 ELECTROCARDIOGRAM COMPLETE: CPT

## 2019-08-26 PROCEDURE — 99386 PREV VISIT NEW AGE 40-64: CPT | Mod: 25

## 2019-08-26 PROCEDURE — G0442 ANNUAL ALCOHOL SCREEN 15 MIN: CPT

## 2019-08-26 PROCEDURE — G0444 DEPRESSION SCREEN ANNUAL: CPT

## 2019-08-26 NOTE — ASSESSMENT
[FreeTextEntry1] : Annual\par Chronic headaches: advised compliance with medication.\par Not taking Nortriptyline\par Did not go for Neck PT as advised Rx given.\par GYN UTD\par

## 2019-08-26 NOTE — HEALTH RISK ASSESSMENT
[Very Good] : ~his/her~ current health as very good [No] : In the past 12 months have you used drugs other than those required for medical reasons? No [No falls in past year] : Patient reported no falls in the past year [0] : 1) Little interest or pleasure doing things: Not at all (0) [1] : 2) Feeling down, depressed, or hopeless for several days (1) [Patient reported mammogram was normal] : Patient reported mammogram was normal [Patient reported PAP Smear was normal] : Patient reported PAP Smear was normal [HIV Test offered] : HIV Test offered [None] : None [With Family] : lives with family [] :  [Employed] : employed [Sexually Active] : sexually active [Feels Safe at Home] : Feels safe at home [Smoke Detector] : smoke detector [Carbon Monoxide Detector] : carbon monoxide detector [Seat Belt] :  uses seat belt [Sunscreen] : uses sunscreen [Patient/Caregiver unclear of wishes] : Patient/Caregiver unclear of wishes [] : No [[id number]] : 1 [Change in mental status noted] : No change in mental status noted [Reports changes in hearing] : Reports no changes in hearing [Reports changes in vision] : Reports no changes in vision [Reports changes in dental health] : Reports no changes in dental health [TB Exposure] : is not being exposed to tuberculosis [MammogramDate] : 4/2018 [PapSmearDate] : 4/2018 [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

## 2019-08-26 NOTE — REVIEW OF SYSTEMS
[Joint Pain] : joint pain [Joint Stiffness] : joint stiffness [Muscle Pain] : muscle pain [Negative] : Heme/Lymph [Joint Swelling] : no joint swelling [Muscle Weakness] : no muscle weakness [Back Pain] : no back pain

## 2019-08-26 NOTE — HISTORY OF PRESENT ILLNESS
[FreeTextEntry1] : establish care/ Annual [de-identified] : 44 yr old female is here to establish care.\par Hx of Sjogren`s.\par Pain on defecation for 1 week. Reports she has hemorrhoids.\par Hx of chronic headaches, seen by Neurologist Dr Whiteside 3/19. Not taking the Nortriptyline as advised.\par

## 2019-08-26 NOTE — COUNSELING
[Potential consequences of obesity discussed] : Potential consequences of obesity discussed [Encouraged to use exercise tracking device] : Encouraged to use exercise tracking device [Weigh Self Weekly] : weigh self weekly [Benefits of weight loss discussed] : Benefits of weight loss discussed [Decrease Portions] : decrease portions [____ min/wk Activity] : [unfilled] min/wk activity [Good understanding] : Patient has a good understanding of disease, goals and obesity follow-up plan

## 2019-08-26 NOTE — PHYSICAL EXAM
[Normal] : no carotid or abdominal bruits heard, no varicosities, pedal pulses are present, no peripheral edema, no extremity clubbing or cyanosis and no palpable aorta [Normal Anterior Cervical Nodes] : no anterior cervical lymphadenopathy [No Spinal Tenderness] : no spinal tenderness [No CVA Tenderness] : no CVA  tenderness [No Joint Swelling] : no joint swelling [No Rash] : no rash [Grossly Normal Strength/Tone] : grossly normal strength/tone [Normal Gait] : normal gait [Normal Insight/Judgement] : insight and judgment were intact [Normal Affect] : the affect was normal

## 2019-08-27 ENCOUNTER — APPOINTMENT (OUTPATIENT)
Dept: FAMILY MEDICINE | Facility: CLINIC | Age: 44
End: 2019-08-27

## 2019-08-28 LAB
25(OH)D3 SERPL-MCNC: 28.1 NG/ML
ALBUMIN SERPL ELPH-MCNC: 4.5 G/DL
ALP BLD-CCNC: 75 U/L
ALT SERPL-CCNC: 28 U/L
ANION GAP SERPL CALC-SCNC: 12 MMOL/L
APPEARANCE: CLEAR
AST SERPL-CCNC: 21 U/L
BASOPHILS # BLD AUTO: 0.05 K/UL
BASOPHILS NFR BLD AUTO: 1 %
BILIRUB SERPL-MCNC: 0.2 MG/DL
BILIRUBIN URINE: NEGATIVE
BLOOD URINE: NEGATIVE
BUN SERPL-MCNC: 16 MG/DL
CALCIUM SERPL-MCNC: 9.2 MG/DL
CHLORIDE SERPL-SCNC: 106 MMOL/L
CHOLEST SERPL-MCNC: 196 MG/DL
CHOLEST/HDLC SERPL: 3 RATIO
CO2 SERPL-SCNC: 23 MMOL/L
COLOR: NORMAL
CREAT SERPL-MCNC: 0.71 MG/DL
EOSINOPHIL # BLD AUTO: 0.23 K/UL
EOSINOPHIL NFR BLD AUTO: 4.4 %
GLUCOSE QUALITATIVE U: NEGATIVE
GLUCOSE SERPL-MCNC: 99 MG/DL
HCT VFR BLD CALC: 43.7 %
HDLC SERPL-MCNC: 66 MG/DL
HGB BLD-MCNC: 13.3 G/DL
HIV1+2 AB SPEC QL IA.RAPID: NONREACTIVE
IMM GRANULOCYTES NFR BLD AUTO: 0.2 %
KETONES URINE: NEGATIVE
LDLC SERPL CALC-MCNC: 104 MG/DL
LEUKOCYTE ESTERASE URINE: NEGATIVE
LYMPHOCYTES # BLD AUTO: 1.69 K/UL
LYMPHOCYTES NFR BLD AUTO: 32.5 %
MAN DIFF?: NORMAL
MCHC RBC-ENTMCNC: 28.9 PG
MCHC RBC-ENTMCNC: 30.4 GM/DL
MCV RBC AUTO: 94.8 FL
MONOCYTES # BLD AUTO: 0.42 K/UL
MONOCYTES NFR BLD AUTO: 8.1 %
NEUTROPHILS # BLD AUTO: 2.8 K/UL
NEUTROPHILS NFR BLD AUTO: 53.8 %
NITRITE URINE: NEGATIVE
PH URINE: 6
PLATELET # BLD AUTO: 327 K/UL
POTASSIUM SERPL-SCNC: 4.7 MMOL/L
PROT SERPL-MCNC: 6.9 G/DL
PROTEIN URINE: NEGATIVE
RBC # BLD: 4.61 M/UL
RBC # FLD: 13.3 %
SODIUM SERPL-SCNC: 141 MMOL/L
SPECIFIC GRAVITY URINE: 1.02
T4 SERPL-MCNC: 5.1 UG/DL
TRIGL SERPL-MCNC: 129 MG/DL
TSH SERPL-ACNC: 1.64 UIU/ML
URATE SERPL-MCNC: 4.1 MG/DL
UROBILINOGEN URINE: NORMAL
WBC # FLD AUTO: 5.2 K/UL

## 2019-09-17 LAB — HEMOCCULT STL QL IA: NEGATIVE

## 2019-10-14 ENCOUNTER — APPOINTMENT (OUTPATIENT)
Dept: FAMILY MEDICINE | Facility: CLINIC | Age: 44
End: 2019-10-14

## 2019-10-16 ENCOUNTER — APPOINTMENT (OUTPATIENT)
Dept: FAMILY MEDICINE | Facility: CLINIC | Age: 44
End: 2019-10-16
Payer: COMMERCIAL

## 2019-10-16 VITALS
OXYGEN SATURATION: 97 % | WEIGHT: 161 LBS | DIASTOLIC BLOOD PRESSURE: 80 MMHG | RESPIRATION RATE: 16 BRPM | HEIGHT: 63 IN | SYSTOLIC BLOOD PRESSURE: 110 MMHG | BODY MASS INDEX: 28.53 KG/M2 | HEART RATE: 79 BPM

## 2019-10-16 DIAGNOSIS — R10.9 UNSPECIFIED ABDOMINAL PAIN: ICD-10-CM

## 2019-10-16 DIAGNOSIS — K58.9 IRRITABLE BOWEL SYNDROME W/OUT DIARRHEA: ICD-10-CM

## 2019-10-16 DIAGNOSIS — J30.9 ALLERGIC RHINITIS, UNSPECIFIED: ICD-10-CM

## 2019-10-16 PROCEDURE — 90686 IIV4 VACC NO PRSV 0.5 ML IM: CPT

## 2019-10-16 PROCEDURE — G0008: CPT

## 2019-10-16 PROCEDURE — 99214 OFFICE O/P EST MOD 30 MIN: CPT | Mod: 25

## 2019-10-16 NOTE — REVIEW OF SYSTEMS
[Nasal Discharge] : nasal discharge [Postnasal Drip] : postnasal drip [Cough] : cough [Abdominal Pain] : abdominal pain [Nausea] : no nausea [Constipation] : no constipation [Diarrhea] : diarrhea [Vomiting] : no vomiting [Heartburn] : no heartburn [Melena] : no melena [Joint Pain] : no joint pain [Joint Stiffness] : no joint stiffness [Joint Swelling] : no joint swelling [Muscle Weakness] : no muscle weakness [Muscle Pain] : no muscle pain [Back Pain] : no back pain [Negative] : Heme/Lymph

## 2019-10-16 NOTE — ASSESSMENT
[FreeTextEntry1] : Allergies\par Allegra D/ Flonase\par Follow up/ call if not improved\par Flu vaccine administered\par Abdominal Discomfort/ ? IBS\par Referred to GI

## 2019-10-16 NOTE — PHYSICAL EXAM
[Normal] : soft, non-tender, non-distended, no masses palpated, no HSM and normal bowel sounds [Normal Anterior Cervical Nodes] : no anterior cervical lymphadenopathy [No CVA Tenderness] : no CVA  tenderness [No Spinal Tenderness] : no spinal tenderness [No Joint Swelling] : no joint swelling [Grossly Normal Strength/Tone] : grossly normal strength/tone [No Rash] : no rash [Normal Gait] : normal gait [Normal Affect] : the affect was normal [Normal Insight/Judgement] : insight and judgment were intact

## 2019-10-16 NOTE — HISTORY OF PRESENT ILLNESS
[Cold Symptoms] : cold symptoms [Moderate] : moderate [___ Weeks ago] :  [unfilled] weeks ago [Constant] : constant [Congestion] : congestion [Cough] : cough [Sore Throat] : no sore throat [Wheezing] : no wheezing [Chills] : no chills [Anorexia] : no anorexia [Shortness Of Breath] : no shortness of breath [Earache] : no earache [Fatigue] : not fatigue [Headache] : no headache [Fever] : no fever [OTC Remedies] : OTC remedies [Stable] : stable [FreeTextEntry8] : Abdominal discomfort 5 months ago, seen by GYN was advised to see GI did not go.\par No diarrhea\par No N/V\par No fever\par

## 2019-10-30 ENCOUNTER — EMERGENCY (EMERGENCY)
Facility: HOSPITAL | Age: 44
LOS: 0 days | Discharge: HOME | End: 2019-10-30
Attending: EMERGENCY MEDICINE
Payer: COMMERCIAL

## 2019-10-30 VITALS
TEMPERATURE: 98 F | SYSTOLIC BLOOD PRESSURE: 114 MMHG | HEART RATE: 66 BPM | DIASTOLIC BLOOD PRESSURE: 68 MMHG | OXYGEN SATURATION: 98 % | RESPIRATION RATE: 18 BRPM

## 2019-10-30 VITALS
HEART RATE: 61 BPM | TEMPERATURE: 99 F | RESPIRATION RATE: 15 BRPM | DIASTOLIC BLOOD PRESSURE: 84 MMHG | OXYGEN SATURATION: 95 % | HEIGHT: 64 IN | WEIGHT: 149.91 LBS | SYSTOLIC BLOOD PRESSURE: 144 MMHG

## 2019-10-30 VITALS
OXYGEN SATURATION: 96 % | SYSTOLIC BLOOD PRESSURE: 123 MMHG | DIASTOLIC BLOOD PRESSURE: 65 MMHG | RESPIRATION RATE: 18 BRPM | HEART RATE: 88 BPM | TEMPERATURE: 98 F

## 2019-10-30 VITALS — WEIGHT: 149.91 LBS | HEIGHT: 63 IN

## 2019-10-30 DIAGNOSIS — R51 HEADACHE: ICD-10-CM

## 2019-10-30 DIAGNOSIS — M19.90 UNSPECIFIED OSTEOARTHRITIS, UNSPECIFIED SITE: ICD-10-CM

## 2019-10-30 DIAGNOSIS — Z90.710 ACQUIRED ABSENCE OF BOTH CERVIX AND UTERUS: Chronic | ICD-10-CM

## 2019-10-30 DIAGNOSIS — J02.9 ACUTE PHARYNGITIS, UNSPECIFIED: ICD-10-CM

## 2019-10-30 DIAGNOSIS — G44.209 TENSION-TYPE HEADACHE, UNSPECIFIED, NOT INTRACTABLE: ICD-10-CM

## 2019-10-30 DIAGNOSIS — M25.511 PAIN IN RIGHT SHOULDER: ICD-10-CM

## 2019-10-30 DIAGNOSIS — R11.0 NAUSEA: ICD-10-CM

## 2019-10-30 LAB
ALBUMIN SERPL ELPH-MCNC: 4.1 G/DL — SIGNIFICANT CHANGE UP (ref 3.3–5)
ALP SERPL-CCNC: 80 U/L — SIGNIFICANT CHANGE UP (ref 40–120)
ALT FLD-CCNC: 30 U/L — SIGNIFICANT CHANGE UP (ref 12–78)
ANION GAP SERPL CALC-SCNC: 5 MMOL/L — SIGNIFICANT CHANGE UP (ref 5–17)
APPEARANCE UR: ABNORMAL
AST SERPL-CCNC: 15 U/L — SIGNIFICANT CHANGE UP (ref 15–37)
BASOPHILS # BLD AUTO: 0.04 K/UL — SIGNIFICANT CHANGE UP (ref 0–0.2)
BASOPHILS NFR BLD AUTO: 0.4 % — SIGNIFICANT CHANGE UP (ref 0–2)
BILIRUB SERPL-MCNC: 0.3 MG/DL — SIGNIFICANT CHANGE UP (ref 0.2–1.2)
BILIRUB UR-MCNC: NEGATIVE — SIGNIFICANT CHANGE UP
BUN SERPL-MCNC: 8 MG/DL — SIGNIFICANT CHANGE UP (ref 7–23)
CALCIUM SERPL-MCNC: 9.4 MG/DL — SIGNIFICANT CHANGE UP (ref 8.5–10.1)
CHLORIDE SERPL-SCNC: 106 MMOL/L — SIGNIFICANT CHANGE UP (ref 96–108)
CO2 SERPL-SCNC: 30 MMOL/L — SIGNIFICANT CHANGE UP (ref 22–31)
COLOR SPEC: YELLOW — SIGNIFICANT CHANGE UP
CREAT SERPL-MCNC: 0.85 MG/DL — SIGNIFICANT CHANGE UP (ref 0.5–1.3)
DIFF PNL FLD: NEGATIVE — SIGNIFICANT CHANGE UP
EOSINOPHIL # BLD AUTO: 0.02 K/UL — SIGNIFICANT CHANGE UP (ref 0–0.5)
EOSINOPHIL NFR BLD AUTO: 0.2 % — SIGNIFICANT CHANGE UP (ref 0–6)
GLUCOSE SERPL-MCNC: 125 MG/DL — HIGH (ref 70–99)
GLUCOSE UR QL: NEGATIVE MG/DL — SIGNIFICANT CHANGE UP
HCT VFR BLD CALC: 40.6 % — SIGNIFICANT CHANGE UP (ref 34.5–45)
HGB BLD-MCNC: 13.6 G/DL — SIGNIFICANT CHANGE UP (ref 11.5–15.5)
IMM GRANULOCYTES NFR BLD AUTO: 0.3 % — SIGNIFICANT CHANGE UP (ref 0–1.5)
KETONES UR-MCNC: NEGATIVE — SIGNIFICANT CHANGE UP
LEUKOCYTE ESTERASE UR-ACNC: NEGATIVE — SIGNIFICANT CHANGE UP
LYMPHOCYTES # BLD AUTO: 1.26 K/UL — SIGNIFICANT CHANGE UP (ref 1–3.3)
LYMPHOCYTES # BLD AUTO: 11.2 % — LOW (ref 13–44)
MCHC RBC-ENTMCNC: 29.4 PG — SIGNIFICANT CHANGE UP (ref 27–34)
MCHC RBC-ENTMCNC: 33.5 GM/DL — SIGNIFICANT CHANGE UP (ref 32–36)
MCV RBC AUTO: 87.9 FL — SIGNIFICANT CHANGE UP (ref 80–100)
MONOCYTES # BLD AUTO: 0.37 K/UL — SIGNIFICANT CHANGE UP (ref 0–0.9)
MONOCYTES NFR BLD AUTO: 3.3 % — SIGNIFICANT CHANGE UP (ref 2–14)
NEUTROPHILS # BLD AUTO: 9.54 K/UL — HIGH (ref 1.8–7.4)
NEUTROPHILS NFR BLD AUTO: 84.6 % — HIGH (ref 43–77)
NITRITE UR-MCNC: NEGATIVE — SIGNIFICANT CHANGE UP
PH UR: 9 — HIGH (ref 5–8)
PLATELET # BLD AUTO: 301 K/UL — SIGNIFICANT CHANGE UP (ref 150–400)
POTASSIUM SERPL-MCNC: 3.5 MMOL/L — SIGNIFICANT CHANGE UP (ref 3.5–5.3)
POTASSIUM SERPL-SCNC: 3.5 MMOL/L — SIGNIFICANT CHANGE UP (ref 3.5–5.3)
PROT SERPL-MCNC: 7.4 GM/DL — SIGNIFICANT CHANGE UP (ref 6–8.3)
PROT UR-MCNC: NEGATIVE MG/DL — SIGNIFICANT CHANGE UP
RBC # BLD: 4.62 M/UL — SIGNIFICANT CHANGE UP (ref 3.8–5.2)
RBC # FLD: 12.8 % — SIGNIFICANT CHANGE UP (ref 10.3–14.5)
SODIUM SERPL-SCNC: 141 MMOL/L — SIGNIFICANT CHANGE UP (ref 135–145)
SP GR SPEC: 1.01 — SIGNIFICANT CHANGE UP (ref 1.01–1.02)
UROBILINOGEN FLD QL: NEGATIVE MG/DL — SIGNIFICANT CHANGE UP
WBC # BLD: 11.26 K/UL — HIGH (ref 3.8–10.5)
WBC # FLD AUTO: 11.26 K/UL — HIGH (ref 3.8–10.5)

## 2019-10-30 PROCEDURE — 70450 CT HEAD/BRAIN W/O DYE: CPT

## 2019-10-30 PROCEDURE — 96374 THER/PROPH/DIAG INJ IV PUSH: CPT

## 2019-10-30 PROCEDURE — 70450 CT HEAD/BRAIN W/O DYE: CPT | Mod: 26

## 2019-10-30 PROCEDURE — 99284 EMERGENCY DEPT VISIT MOD MDM: CPT | Mod: 25

## 2019-10-30 PROCEDURE — 99284 EMERGENCY DEPT VISIT MOD MDM: CPT

## 2019-10-30 PROCEDURE — 80053 COMPREHEN METABOLIC PANEL: CPT

## 2019-10-30 PROCEDURE — 85025 COMPLETE CBC W/AUTO DIFF WBC: CPT

## 2019-10-30 PROCEDURE — 96361 HYDRATE IV INFUSION ADD-ON: CPT

## 2019-10-30 PROCEDURE — 36415 COLL VENOUS BLD VENIPUNCTURE: CPT

## 2019-10-30 PROCEDURE — 99283 EMERGENCY DEPT VISIT LOW MDM: CPT

## 2019-10-30 PROCEDURE — 96372 THER/PROPH/DIAG INJ SC/IM: CPT | Mod: 59

## 2019-10-30 PROCEDURE — 87086 URINE CULTURE/COLONY COUNT: CPT

## 2019-10-30 PROCEDURE — 81001 URINALYSIS AUTO W/SCOPE: CPT

## 2019-10-30 RX ORDER — CYCLOBENZAPRINE HYDROCHLORIDE 10 MG/1
1 TABLET, FILM COATED ORAL
Qty: 12 | Refills: 0
Start: 2019-10-30

## 2019-10-30 RX ORDER — LIDOCAINE 4 G/100G
1 CREAM TOPICAL ONCE
Refills: 0 | Status: COMPLETED | OUTPATIENT
Start: 2019-10-30 | End: 2019-10-30

## 2019-10-30 RX ORDER — PROCHLORPERAZINE MALEATE 5 MG
10 TABLET ORAL ONCE
Refills: 0 | Status: COMPLETED | OUTPATIENT
Start: 2019-10-30 | End: 2019-10-30

## 2019-10-30 RX ORDER — ACETAMINOPHEN 500 MG
1000 TABLET ORAL ONCE
Refills: 0 | Status: COMPLETED | OUTPATIENT
Start: 2019-10-30 | End: 2019-10-30

## 2019-10-30 RX ORDER — SODIUM CHLORIDE 9 MG/ML
1000 INJECTION INTRAMUSCULAR; INTRAVENOUS; SUBCUTANEOUS ONCE
Refills: 0 | Status: COMPLETED | OUTPATIENT
Start: 2019-10-30 | End: 2019-10-30

## 2019-10-30 RX ORDER — DIPHENHYDRAMINE HCL 50 MG
50 CAPSULE ORAL EVERY 4 HOURS
Refills: 0 | Status: DISCONTINUED | OUTPATIENT
Start: 2019-10-30 | End: 2019-10-30

## 2019-10-30 RX ADMIN — LIDOCAINE 1 PATCH: 4 CREAM TOPICAL at 14:42

## 2019-10-30 RX ADMIN — SODIUM CHLORIDE 1000 MILLILITER(S): 9 INJECTION INTRAMUSCULAR; INTRAVENOUS; SUBCUTANEOUS at 22:31

## 2019-10-30 RX ADMIN — SODIUM CHLORIDE 1000 MILLILITER(S): 9 INJECTION INTRAMUSCULAR; INTRAVENOUS; SUBCUTANEOUS at 23:31

## 2019-10-30 RX ADMIN — Medication 50 MILLIGRAM(S): at 22:31

## 2019-10-30 RX ADMIN — Medication 10 MILLIGRAM(S): at 22:31

## 2019-10-30 RX ADMIN — Medication 1000 MILLIGRAM(S): at 14:39

## 2019-10-30 NOTE — ED PROVIDER NOTE - CARE PLAN
Principal Discharge DX:	Nonintractable headache, unspecified chronicity pattern, unspecified headache type  Secondary Diagnosis:	Migraine without status migrainosus, not intractable, unspecified migraine type

## 2019-10-30 NOTE — ED STATDOCS - NSFOLLOWUPINSTRUCTIONS_ED_ALL_ED_FT
Pharyngitis    WHAT YOU NEED TO KNOW:    Pharyngitis, or sore throat, is inflammation of the tissues and structures in your pharynx (throat). Pharyngitis is most often caused by bacteria. It may also be caused by a cold or flu virus. Other causes include smoking, allergies, or acid reflux.     DISCHARGE INSTRUCTIONS:    Call 911 for any of the following:     You have trouble breathing or swallowing because your throat is swollen or sore.        Return to the emergency department if:     You are drooling because it hurts too much to swallow.      Your fever is higher than 102°F (39°C) or lasts longer than 3 days.      You are confused.      You taste blood in your throat.    Contact your healthcare provider if:     Your throat pain gets worse.      You have a painful lump in your throat that does not go away after 5 days.      Your symptoms do not improve after 5 days.      You have questions or concerns about your condition or care.    Medicines: Viral pharyngitis will go away on its own without treatment. Your sore throat should start to feel better in 3 to 5 days for both viral and bacterial infections. You may need any of the following:     Antibiotics treat a bacterial infection.      NSAIDs, such as ibuprofen, help decrease swelling, pain, and fever. NSAIDs can cause stomach bleeding or kidney problems in certain people. If you take blood thinner medicine, always ask your healthcare provider if NSAIDs are safe for you. Always read the medicine label and follow directions.      Acetaminophen decreases pain and fever. It is available without a doctor's order. Ask how much to take and how often to take it. Follow directions. Acetaminophen can cause liver damage if not taken correctly.      Take your medicine as directed. Contact your healthcare provider if you think your medicine is not helping or if you have side effects. Tell him or her if you are allergic to any medicine. Keep a list of the medicines, vitamins, and herbs you take. Include the amounts, and when and why you take them. Bring the list or the pill bottles to follow-up visits. Carry your medicine list with you in case of an emergency.    Manage your symptoms:     Gargle salt water. Mix ¼ teaspoon salt in an 8 ounce glass of warm water and gargle. This may help decrease swelling in your throat.      Drink liquids as directed. You may need to drink more liquids than usual. Liquids may help soothe your throat and prevent dehydration. Ask how much liquid to drink each day and which liquids are best for you.      Use a cool-steam humidifier to help moisten the air in your room and calm your cough.      Soothe your throat with cough drops, ice, soft foods, or popsicles.    Prevent the spread of pharyngitis: Cover your mouth and nose when you cough or sneeze. Do not share food or drinks. Wash your hands often. Use soap and water. If soap and water are unavailable, use an alcohol based hand .     Follow up with your healthcare provider as directed: Write down your questions so you remember to ask them during your visits.      Back Pain    WHAT YOU NEED TO KNOW:    Back pain is common. It can be caused by many conditions, such as arthritis or the breakdown of spinal discs. Your risk for back pain is increased by injuries, lack of activity, or repeated bending and twisting. You may feel sore or stiff on one or both sides of your back. The pain may spread to your buttocks or thighs.    DISCHARGE INSTRUCTIONS:    Return to the emergency department if:     You have pain, numbness, or weakness in one or both legs.      Your pain becomes so severe that you cannot walk.      You cannot control your urine or bowel movements.      You have severe back pain with chest pain.      You have severe back pain, nausea, and vomiting.      You have severe back pain that spreads to your side or genital area.    Contact your healthcare provider if:     You have back pain that does not get better with rest and pain medicine.      You have a fever.      You have pain that worsens when you are on your back or when you rest.      You have pain that worsens when you cough or sneeze.      You lose weight without trying.      You have questions or concerns about your condition or care.    Medicines:     NSAIDs help decrease swelling and pain. This medicine is available with or without a doctor's order. NSAIDs can cause stomach bleeding or kidney problems in certain people. If you take blood thinner medicine, always ask your healthcare provider if NSAIDs are safe for you. Always read the medicine label and follow directions.      Acetaminophen decreases pain and fever. It is available without a doctor's order. Ask how much to take and how often to take it. Follow directions. Read the labels of all other medicines you are using to see if they also contain acetaminophen, or ask your doctor or pharmacist. Acetaminophen can cause liver damage if not taken correctly. Do not use more than 4 grams (4,000 milligrams) total of acetaminophen in one day.       Muscle relaxers help decrease muscle spasms and back pain.      Prescription pain medicine may be given. Ask your healthcare provider how to take this medicine safely. Some prescription pain medicines contain acetaminophen. Do not take other medicines that contain acetaminophen without talking to your healthcare provider. Too much acetaminophen may cause liver damage. Prescription pain medicine may cause constipation. Ask your healthcare provider how to prevent or treat constipation.       Take your medicine as directed. Contact your healthcare provider if you think your medicine is not helping or if you have side effects. Tell him or her if you are allergic to any medicine. Keep a list of the medicines, vitamins, and herbs you take. Include the amounts, and when and why you take them. Bring the list or the pill bottles to follow-up visits. Carry your medicine list with you in case of an emergency.    How to manage your back pain:     Apply ice on your back for 15 to 20 minutes every hour or as directed. Use an ice pack, or put crushed ice in a plastic bag. Cover it with a towel before you apply it to your skin. Ice helps prevent tissue damage and decreases pain.      Apply heat on your back for 20 to 30 minutes every 2 hours for as many days as directed. Heat helps decrease pain and muscle spasms.      Stay active as much as you can without causing more pain. Bed rest could make your back pain worse. Avoid heavy lifting until your pain is gone.      Go to physical therapy as directed. A physical therapist can teach you exercises to help improve movement and strength, and to decrease pain.    Follow up with your healthcare provider in 2 weeks, or as directed: Write down your questions so you remember to ask them during your visits. Enfermedades virales en los adultos  (Viral Illness, Adult)  Los virus son microbios diminutos que entran en el organismo de tate persona y causan enfermedades. Hay muchos tipos de virus diferentes y causan muchas clases de enfermedades. Las enfermedades virales pueden ser leves o graves. Pueden afectar diferentes partes del cuerpo.  Las enfermedades frecuentes causadas por virus incluyen los resfríos y la gripe. Además, las enfermedades virales abarcan obi clínicos graves, nigel el VIH/sida (virus de inmunodeficiencia humana/síndrome de inmunodeficiencia adquirida). Se ibarra identificado unos pocos virus asociados con determinados tipos de cáncer.  ¿CUÁLES SON LAS CAUSAS?  Muchos tipos de virus pueden causar enfermedades. Los virus invaden las células del organismo, se multiplican y provocan la disfunción o la muerte de las células infectadas. Cuando la célula muere, libera más virus. Cuando esto ocurre, aparecen síntomas de la enfermedad, y el virus sigue diseminándose a otras células. Si el virus asume la función de la célula, puede hacer que esta se divida y crezca fuera de control, y opal es el michelle en el que un virus causa cáncer.  Los diferentes virus ingresan al organismo de distintas formas. Puede contraer un virus de la siguiente manera:  Al ingerir alimentos o beber agua contaminados con el virus.Al inhalar gotitas que tate persona infectada liberó en el aire al toser o estornudar.Al tocar tate superficie contaminada con el virus y luego llevarse la mano a la boca, la nariz o los ojos.Al ser quintin por un insecto o mordido por un animal que son portadores del virus.Al tener contacto sexual con tate persona infectada por el virus.Al tener contacto con manish o líquidos que contienen el virus, ya sea a través de un stephy abierto o susan tate transfusión.Si el virus ingresa al organismo, el sistema de defensa del cuerpo (sistema inmunitario) intentará combatirlo. Puede correr un riesgo más alto de tener tate enfermedad viral si tiene el sistema inmunitario debilitado.  ¿CUÁLES SON LOS SIGNOS O LOS SÍNTOMAS?  Los síntomas varían en función del tipo de virus y de la ubicación de las células que opal invade. Los síntomas frecuentes de los principales tipos de enfermedades virales incluyen los siguientes:  Virus del resfrío y de la gripe   Fiebre.Dolor de erum.Dolor de garganta.Ana Cristina musculares.Congestión nasal.Tos.Virus del aparato digestivo (gastrointestinales)  Fiebre.Dolor abdominal.Náuseas.Diarrea.Virus hepáticos (hepatitis)   Pérdida del apetito.Cansancio.Manuel amarillento de la piel (ictericia).Virus del cerebro y la médula case   Fiebre.Dolor de erum.Rigidez en el andre.Náuseas y vómitos.Confusión o somnolencia.Virus de la piel   Verrugas.Picazón.Erupción cutánea.Virus de transmisión sexual   Secreción.Hinchazón.Enrojecimiento.Erupción cutánea.¿CÓMO SE TRATA ESTA AFECCIÓN?  Los virus pueden ser difíciles de tratar porque se hospedan en las células. Los antibióticos no tratan los virus porque no llegan al interior de las células. El tratamiento de tate enfermedad viral puede incluir lo siguiente:  Descansar y beber abundantes líquidos.Medicamentos para aliviar los síntomas. Estos pueden incluir medicamentos de venta rani para el dolor y la fiebre, medicamentos para la tos o la congestión y medicamentos para aliviar la diarrea.Medicamentos antivirales. Estos fármacos están disponibles únicamente para determinados tipos de virus. Pueden ayudar a aliviar los síntomas de la gripe, si se keerthi apenas comienza el cuadro. También hay antivirales para la hepatitis y el VIH/sida.Algunas enfermedades virales pueden evitarse con vacunas. Un ejemplo frecuente es la vacuna antigripal.  SIGA ESTAS INDICACIONES EN AKY CASA:  Medicamentos   ImageTome los medicamentos de venta rani y los recetados solamente nigel se lo haya indicado el médico.Si le recetaron un antiviral, tómelo nigel se lo haya indicado el médico. No deje de nika los medicamentos aunque comience a sentirse mejor.Infórmese sobre cuándo los antibióticos son necesarios y cuándo no. Los antibióticos no combaten a los virus. Si el médico fatoumata que usted puede tener tate infección bacteriana así nigel tate viral, teresas vez le receten un antibiótico.  No solicite tate receta de antibióticos si le ibarra diagnosticado tate enfermedad viral. Eso no hará que la enfermedad pase más rápidamente.Nika antibióticos con frecuencia cuando no son necesarios puede derivar en resistencia a los antibióticos. Cuando esto ocurre, el medicamento pierde kay eficacia contra las bacterias que normalmente combate.Instrucciones generales   Orquidea suficiente líquido para mantener la orina sherri o de color amarillo pálido.Descanse todo lo que pueda.Retome allan actividades normales nigel se lo haya indicado el médico. Pregúntele al médico qué actividades son seguras para usted.Concurra a todas las visitas de control nigel se lo haya indicado el médico. Ivey es importante.¿CÓMO SE SHANEKA ESTO?  ImageTome estas medidas para reducir el riesgo de tener tate infección viral:  Consuma tate dieta gomez y descanse mucho.Lávese las isra frecuentemente con agua y jabón. Ivey es especialmente importante cuando está en lugares públicos. Use desinfectante para isra si no dispone de agua y jabón.Evite el contacto cercano con amigos y familiares que tengan tate infección viral.Si viaja a las regiones donde las infecciones virales gastrointestinales son frecuentes, no tome agua ni coma alimentos crudos.Manténgase al día con las vacunas. Vacúnese contra la gripe todos los años nigel se lo haya indicado el médico.No comparta cepillos de dientes, cortaúñas, rasuradoras o agujas con otras personas.Siempre tenga sexo con protección.COMUNÍQUESE CON UN MÉDICO SI:  Tiene síntomas de tate enfermedad viral que no desaparecen.Los síntomas regresan después de rebecca desaparecido.Los síntomas empeoran.SOLICITE AYUDA DE INMEDIATO SI:  Tiene dificultad para respirar.Siente un dolor de erum intenso o rigidez en el andre.Tiene vómitos adrien o dolor abdominal.Esta información no tiene nigel fin reemplazar el consejo del médico. Asegúrese de hacerle al médico cualquier pregunta que tenga.

## 2019-10-30 NOTE — ED ADULT NURSE NOTE - OBJECTIVE STATEMENT
pt arrives to ED complaining of headache and sore throat. pt was recently seen and discharged form  ED for similar symptoms. Hx of Sjorgens syndome, arthritis. alert and oriented x 4. ambulatory.

## 2019-10-30 NOTE — ED PROVIDER NOTE - NEUROLOGICAL, MLM
Alert and oriented, no focal deficits, no motor or sensory deficits. no nuco rigidity Alert and oriented, no focal deficits, no motor or sensory deficits. CNs 2-12 intact. NIH=0 GCS=15 FTN movement intact. Intact gait. No nystagmus.

## 2019-10-30 NOTE — ED PROVIDER NOTE - PATIENT PORTAL LINK FT
You can access the FollowMyHealth Patient Portal offered by [facility] by registering at the following website: [email or web address]. By joining [employer]’s FollowMyHealth portal, you will also be able to view your health information using other applications (apps) compatible with our system.

## 2019-10-30 NOTE — ED STATDOCS - OBJECTIVE STATEMENT
43 y/o female with PMHx of arthritis, Sjogren's syndrome presents to the ED c/o sore throat, right arm pain x3 days. States her right arm pain feels like a pinching sensation, exacerbated with movement. Denies fever. Was evaluated for her symptoms earlier, prescribed celecoxib and amoxicillin.

## 2019-10-30 NOTE — ED STATDOCS - PROGRESS NOTE DETAILS
Patient seen and evaluated with ED attending at intake.  She is reporting 1 week of sore throat and R shoulder pain radiating down arm.  She has been on amoxil from her dentist for a tooth.  Throat with normal exam, no erythema or exudates.  As she has TTP over right trapezium and pain with ROM of arm, will treat for musclar pain/spasm.  PMD f/u and return precautions reviewed with patient -Shazia Garza PA-C

## 2019-10-30 NOTE — ED ADULT NURSE NOTE - NSSUHOSCREENINGYN_ED_ALL_ED
[facility]  Progress Note    Patient: Leigh Fearing Age: 67 y.o. : 1944 MR#: [id number] SSN: [id number]  Date: 2019     Subjective:   Alert and oriented X 3. Family at bedside. Reports SOB since last month. Lower back pain. No chest pain / chest discomfort. Assessment/Plan:     1. Suspected Left lower lobe acute PE per VQ  scan  2. Acute Bilateral DVT to femoral, popliteal, peroneal veins. 3. Recently treated pneumonia   4. Physical Deconditioning   5. HTN however Hypotension on admission r/t #1.   6. MCBRIDE suspect r/t #1. Improved   7. Suspected COURTNEY  8. Morbid obesity   9. HLD  10. GERD  11. Osteoarthritis   12. Hemochromatosis   13. Hx. ??A. Fib / arrhythmia : Not on anticoagulation  14. Diastolic HF - compensated   15. Chronic peripheral edema  16. CKD stage 1-3. PLAN  1. Pulmonology following. Oncology consulted. Echo pending. Cont hep gtt, nebs, symbicort, steroids. Doubtful of pna, suspect presenting symptoms in ED d/t PE, will d/c abx. Cont bronchial hygiene protocol, IS. Would benefit from OP cardiology work up as well. 2. PT/TO following. 3. Continue IVF, BP slowing improving, current 97/62. Patient asymptomatic.      Additional Notes:      Case discussed with:  [x]Patient  [x]Family  [x]Nursing  [x]Case Management  DVT Prophylaxis:  []Lovenox  []Hep SQ  []SCDs  []Coumadin   [x]On Heparin gtt    Objective:   VS:   Visit Vitals  BP 97/78   Pulse 84   Temp 98.2 °F (36.8 °C)   Resp 18   Ht 5' (1.524 m)   Wt 98 kg (216 lb)   SpO2 98%   BMI 42.18 kg/m²      Tmax/24hrs: Temp (24hrs), Av.9 °F (36.6 °C), Min:97.5 °F (36.4 °C), Max:98.2 °F (36.8 °C)      Intake/Output Summary (Last 24 hours) at 2019 1157  Last data filed at 2019 3237  Gross per 24 hour   Intake 5422.5 ml   Output 1300 ml   Net 4122.5 ml       General:  Alert, NAD  Cardiovascular:  IRIR  Pulmonary:  Diminished left lobe; respiratory effort WNL  GI:  +BS in all four quadrants, soft, non-tender  Extremities:  +1 edema; 2+ dorsalis pedis pulses bilaterally  Neuro:  Alert and oriented X 3. Labs:    Recent Results (from the past 24 hour(s))   CULTURE, BLOOD    Collection Time: 07/22/19 12:25 PM   Result Value Ref Range    Special Requests: NO SPECIAL REQUESTS      Culture result: NO GROWTH AFTER 18 HOURS     CULTURE, BLOOD    Collection Time: 07/22/19 12:40 PM   Result Value Ref Range    Special Requests: NO SPECIAL REQUESTS      Culture result: NO GROWTH AFTER 18 HOURS     CBC WITH AUTOMATED DIFF    Collection Time: 07/22/19 12:40 PM   Result Value Ref Range    WBC 8.5 4.6 - 13.2 K/uL    RBC 3.66 (L) 4.20 - 5.30 M/uL    HGB 12.2 12.0 - 16.0 g/dL    HCT 35.5 35.0 - 45.0 %    MCV 97.0 74.0 - 97.0 FL    MCH 33.3 24.0 - 34.0 PG    MCHC 34.4 31.0 - 37.0 g/dL    RDW 15.0 (H) 11.6 - 14.5 %    PLATELET 903 451 - 752 K/uL    MPV 11.2 9.2 - 11.8 FL    NEUTROPHILS 62 40 - 73 %    LYMPHOCYTES 21 21 - 52 %    MONOCYTES 9 3 - 10 %    EOSINOPHILS 7 (H) 0 - 5 %    BASOPHILS 1 0 - 2 %    ABS. NEUTROPHILS 5.3 1.8 - 8.0 K/UL    ABS. LYMPHOCYTES 1.8 0.9 - 3.6 K/UL    ABS. MONOCYTES 0.8 0.05 - 1.2 K/UL    ABS. EOSINOPHILS 0.6 (H) 0.0 - 0.4 K/UL    ABS.  BASOPHILS 0.1 0.0 - 0.1 K/UL    DF AUTOMATED     POC LACTIC ACID    Collection Time: 07/22/19 12:48 PM   Result Value Ref Range    Lactic Acid (POC) 1.82 0.40 - 2.00 mmol/L   EKG, 12 LEAD, INITIAL    Collection Time: 07/22/19  1:20 PM   Result Value Ref Range    Ventricular Rate 81 BPM    Atrial Rate 81 BPM    P-R Interval 192 ms    QRS Duration 50 ms    Q-T Interval 320 ms    QTC Calculation (Bezet) 371 ms    Calculated R Axis -5 degrees    Calculated T Axis 61 degrees    Diagnosis       Normal sinus rhythm with sinus arrhythmia  Cannot rule out Anterior infarct , age undetermined  Abnormal ECG  When compared with ECG of 20-JUL-2019 10:43,  Sinus rhythm has replaced Atrial fibrillation  ST now depressed in Lateral leads  Nonspecific T wave abnormality, worse in Lateral leads  Confirmed by Haleigh Dorado MD, --- ([id number]) on 7/22/2019 3:44:37 PM     URINALYSIS W/ RFLX MICROSCOPIC    Collection Time: 07/22/19  2:43 PM   Result Value Ref Range    Color YELLOW      Appearance CLEAR      Specific gravity 1.008 1.005 - 1.030      pH (UA) 5.0 5.0 - 8.0      Protein NEGATIVE  NEG mg/dL    Glucose NEGATIVE  NEG mg/dL    Ketone NEGATIVE  NEG mg/dL    Bilirubin NEGATIVE  NEG      Blood NEGATIVE  NEG      Urobilinogen 0.2 0.2 - 1.0 EU/dL    Nitrites NEGATIVE  NEG      Leukocyte Esterase NEGATIVE  NEG     CULTURE, URINE    Collection Time: 07/22/19  2:43 PM   Result Value Ref Range    Special Requests: NO SPECIAL REQUESTS      Culture result:        <10,000  COLONIES/mL  MIXED GRAM POSITIVE ELIZABETH, PROBABLE SKIN/GENITAL CONTAMINATION. METABOLIC PANEL, COMPREHENSIVE    Collection Time: 07/22/19  3:33 PM   Result Value Ref Range    Sodium 140 136 - 145 mmol/L    Potassium 3.8 3.5 - 5.5 mmol/L    Chloride 109 100 - 111 mmol/L    CO2 24 21 - 32 mmol/L    Anion gap 7 3.0 - 18 mmol/L    Glucose 76 74 - 99 mg/dL    BUN 53 (H) 7.0 - 18 MG/DL    Creatinine 1.87 (H) 0.6 - 1.3 MG/DL    BUN/Creatinine ratio 28 (H) 12 - 20      GFR est AA 32 (L) >60 ml/min/1.73m2    GFR est non-AA 26 (L) >60 ml/min/1.73m2    Calcium 8.0 (L) 8.5 - 10.1 MG/DL    Bilirubin, total 0.3 0.2 - 1.0 MG/DL    ALT (SGPT) 12 (L) 13 - 56 U/L    AST (SGOT) 16 10 - 38 U/L    Alk.  phosphatase 55 45 - 117 U/L    Protein, total 5.5 (L) 6.4 - 8.2 g/dL    Albumin 2.7 (L) 3.4 - 5.0 g/dL    Globulin 2.8 2.0 - 4.0 g/dL    A-G Ratio 1.0 0.8 - 1.7     NT-PRO BNP    Collection Time: 07/22/19  3:33 PM   Result Value Ref Range    NT pro- 0 - 4,791 PG/ML   METABOLIC PANEL, BASIC    Collection Time: 07/22/19  9:24 PM   Result Value Ref Range    Sodium 139 136 - 145 mmol/L    Potassium 3.4 (L) 3.5 - 5.5 mmol/L    Chloride 108 100 - 111 mmol/L    CO2 24 21 - 32 mmol/L    Anion gap 7 3.0 - 18 mmol/L    Glucose 137 (H) 74 - 99 mg/dL    BUN 44 (H) 7.0 - 18 MG/DL    Creatinine 1.69 (H) 0.6 - 1.3 MG/DL    BUN/Creatinine ratio 26 (H) 12 - 20      GFR est AA 36 (L) >60 ml/min/1.73m2    GFR est non-AA 29 (L) >60 ml/min/1.73m2    Calcium 8.3 (L) 8.5 - 49.0 MG/DL   METABOLIC PANEL, BASIC    Collection Time: 07/23/19  3:07 AM   Result Value Ref Range    Sodium 138 136 - 145 mmol/L    Potassium 3.9 3.5 - 5.5 mmol/L    Chloride 108 100 - 111 mmol/L    CO2 19 (L) 21 - 32 mmol/L    Anion gap 11 3.0 - 18 mmol/L    Glucose 152 (H) 74 - 99 mg/dL    BUN 43 (H) 7.0 - 18 MG/DL    Creatinine 1.55 (H) 0.6 - 1.3 MG/DL    BUN/Creatinine ratio 28 (H) 12 - 20      GFR est AA 39 (L) >60 ml/min/1.73m2    GFR est non-AA 33 (L) >60 ml/min/1.73m2    Calcium 8.6 8.5 - 10.1 MG/DL   MAGNESIUM    Collection Time: 07/23/19  3:07 AM   Result Value Ref Range    Magnesium 1.5 (L) 1.6 - 2.6 mg/dL   PHOSPHORUS    Collection Time: 07/23/19  3:07 AM   Result Value Ref Range    Phosphorus 2.8 2.5 - 4.9 MG/DL   CBC WITH AUTOMATED DIFF    Collection Time: 07/23/19  3:07 AM   Result Value Ref Range    WBC 5.4 4.6 - 13.2 K/uL    RBC 3.45 (L) 4.20 - 5.30 M/uL    HGB 11.3 (L) 12.0 - 16.0 g/dL    HCT 33.6 (L) 35.0 - 45.0 %    MCV 97.4 (H) 74.0 - 97.0 FL    MCH 32.8 24.0 - 34.0 PG    MCHC 33.6 31.0 - 37.0 g/dL    RDW 15.0 (H) 11.6 - 14.5 %    PLATELET 377 076 - 942 K/uL    MPV 11.1 9.2 - 11.8 FL    NEUTROPHILS 94 (H) 40 - 73 %    LYMPHOCYTES 5 (L) 21 - 52 %    MONOCYTES 1 (L) 3 - 10 %    EOSINOPHILS 0 0 - 5 %    BASOPHILS 0 0 - 2 %    ABS. NEUTROPHILS 5.1 1.8 - 8.0 K/UL    ABS. LYMPHOCYTES 0.3 (L) 0.9 - 3.6 K/UL    ABS. MONOCYTES 0.0 (L) 0.05 - 1.2 K/UL    ABS. EOSINOPHILS 0.0 0.0 - 0.4 K/UL    ABS.  BASOPHILS 0.0 0.0 - 0.1 K/UL    DF AUTOMATED     PROTHROMBIN TIME + INR    Collection Time: 07/23/19  3:07 AM   Result Value Ref Range    Prothrombin time 13.4 11.5 - 15.2 sec    INR 1.1 0.8 - 1.2     PTT    Collection Time: 07/23/19  3:07 AM   Result Value Ref Range    aPTT 31.1 23.0 - 36.4 SEC   CULTURE, RESPIRATORY/SPUTUM/BRONCH W GRAM STAIN    Collection Time: 07/23/19  6:42 AM   Result Value Ref Range    Special Requests: NO SPECIAL REQUESTS      GRAM STAIN 10-25 WBC/lpf      GRAM STAIN 10-25 EPI/lpf      GRAM STAIN MUCUS PRESENT      GRAM STAIN RARE GRAM POSITIVE COCCI IN PAIRS      GRAM STAIN RARE GRAM POSITIVE RODS      Culture result: PENDING    ECHO ADULT COMPLETE    Collection Time: 07/23/19 10:21 AM   Result Value Ref Range    LV E' Lateral Velocity 9.92 cm/s    LV E' Septal Velocity 8.25 cm/s    Tapse 2.09 (A) 1.5 - 2.0 cm    Ao Root D 3.01 cm    LVIDd 3.57 (A) 3.9 - 5.3 cm    LVPWd 0.74 0.6 - 0.9 cm    LVIDs 2.26 cm    IVSd 0.68 0.6 - 0.9 cm    LVOT d 2.02 cm    LVOT Peak Velocity 137.05 cm/s    LVOT Peak Gradient 7.5 mmHg    LVOT VTI 26.88 cm    MV A Luis 83.40 cm/s    MV E Luis 63.52 cm/s    MV E/A 0.80     LV Mass AL 68.4 67 - 162 g    LV Mass AL Index 32.5 (A) 43 - 95 g/m2    E/E' lateral 6.40     E/E' septal 7.70     E/E' ratio (averaged) 7.05     Mitral Valve E Wave Deceleration Time 145.2 ms    Triscuspid Valve Regurgitation Peak Gradient 28.3 mmHg    TR Max Velocity 266.02 cm/s       Signed By: Hailee Byrd NP     July 23, 2019 Yes - the patient is able to be screened

## 2019-10-30 NOTE — ED STATDOCS - CLINICAL SUMMARY MEDICAL DECISION MAKING FREE TEXT BOX
Pt with right trap spasm. Pt drove to ED, already on NSAIDs. Will send prescription for muscle relaxant to pharmacy.

## 2019-10-30 NOTE — ED ADULT TRIAGE NOTE - CHIEF COMPLAINT QUOTE
Patient presents body aches and sore throat for the past three days. Patient also reports headache, denies fever

## 2019-10-30 NOTE — ED PROVIDER NOTE - NS_ ATTENDINGSCRIBEDETAILS _ED_A_ED_FT
I Jagdish Chen MD saw and examined the patient. Scribe documented for me and under my supervision. I have modified the scribe's documentation where necessary to reflect my history, physical exam and other relevant documentations pertinent to the care of the patient.

## 2019-10-30 NOTE — ED STATDOCS - PATIENT PORTAL LINK FT
You can access the FollowMyHealth Patient Portal offered by [facility] by registering at the following website: [email or web address]. By joining [facility]’s FollowMyHealth portal, you will also be able to view your health information using other applications (apps) compatible with our system.

## 2019-10-30 NOTE — ED PROVIDER NOTE - PROGRESS NOTE DETAILS
April JARAMILLO: Patient tolerating PO, in no acute distress, ambulatory, CNs 2-12 intact, NIH=0, no HA, will outpatient follow up and return if any worsening of symptoms.

## 2019-10-30 NOTE — ED PROVIDER NOTE - OBJECTIVE STATEMENT
45 y/o female with PMHx of arthritis, Sjogren's syndrome presents to the ED c/o severe HA x today. +nausea. States that she has had migraines in the past but not as severe as now. Pt was seen in ED earlier today for complains of chest and neck. Pt was discharged and given medication. Has not taken the medication yet.

## 2019-11-01 LAB
CULTURE RESULTS: SIGNIFICANT CHANGE UP
SPECIMEN SOURCE: SIGNIFICANT CHANGE UP

## 2019-11-05 ENCOUNTER — APPOINTMENT (OUTPATIENT)
Dept: FAMILY MEDICINE | Facility: CLINIC | Age: 44
End: 2019-11-05
Payer: COMMERCIAL

## 2019-11-05 VITALS
DIASTOLIC BLOOD PRESSURE: 80 MMHG | WEIGHT: 162 LBS | HEIGHT: 63 IN | SYSTOLIC BLOOD PRESSURE: 110 MMHG | RESPIRATION RATE: 16 BRPM | HEART RATE: 77 BPM | OXYGEN SATURATION: 98 % | BODY MASS INDEX: 28.7 KG/M2

## 2019-11-05 DIAGNOSIS — F32.9 MAJOR DEPRESSIVE DISORDER, SINGLE EPISODE, UNSPECIFIED: ICD-10-CM

## 2019-11-05 PROCEDURE — 99214 OFFICE O/P EST MOD 30 MIN: CPT

## 2019-11-05 NOTE — ASSESSMENT
[FreeTextEntry1] : Migraine/ Headache\par Increase Nortriptyline to 25 mg\par Relpax prn\par Has appointment with Dr Pineda.\par Abdominal Discomfort/ ? IBS\par Has appointment with GI\par Discussed anxiety/ depressed mood: On Cymbalta\par will address issues at home causing stress. \par Meditation/relaxation techniques. \par will start a regular exercise routine.

## 2019-11-05 NOTE — HISTORY OF PRESENT ILLNESS
[FreeTextEntry1] : follow up [de-identified] : Seen in ER for Severe headache and abdominal pain\par CT and work up negative\par She was prescribed Flexeril and Alleve which helps but cyclobenzaprine makes her very sleepy\par She has appointment with Neurologist Dr Pineda 12/8/19\par Gi appointment next week.\par Daughter and  in the room. Anxiety/ stresses with family.

## 2019-11-05 NOTE — REVIEW OF SYSTEMS
[Nasal Discharge] : nasal discharge [Postnasal Drip] : postnasal drip [Cough] : cough [Abdominal Pain] : abdominal pain [Negative] : Heme/Lymph [Nausea] : no nausea [Constipation] : no constipation [Diarrhea] : diarrhea [Vomiting] : no vomiting [Heartburn] : no heartburn [Melena] : no melena [Joint Pain] : no joint pain [Joint Stiffness] : no joint stiffness [Joint Swelling] : no joint swelling [Muscle Weakness] : no muscle weakness [Muscle Pain] : no muscle pain [Back Pain] : no back pain

## 2019-11-11 ENCOUNTER — APPOINTMENT (OUTPATIENT)
Dept: NEUROLOGY | Facility: CLINIC | Age: 44
End: 2019-11-11

## 2019-11-12 ENCOUNTER — APPOINTMENT (OUTPATIENT)
Dept: GASTROENTEROLOGY | Facility: CLINIC | Age: 44
End: 2019-11-12
Payer: COMMERCIAL

## 2019-11-12 VITALS
HEIGHT: 63 IN | DIASTOLIC BLOOD PRESSURE: 68 MMHG | HEART RATE: 78 BPM | BODY MASS INDEX: 27.46 KG/M2 | SYSTOLIC BLOOD PRESSURE: 103 MMHG | WEIGHT: 155 LBS

## 2019-11-12 DIAGNOSIS — K59.00 CONSTIPATION, UNSPECIFIED: ICD-10-CM

## 2019-11-12 PROBLEM — M35.00 SJOGREN SYNDROME, UNSPECIFIED: Chronic | Status: ACTIVE | Noted: 2019-10-31

## 2019-11-12 PROBLEM — M19.90 UNSPECIFIED OSTEOARTHRITIS, UNSPECIFIED SITE: Chronic | Status: ACTIVE | Noted: 2019-10-31

## 2019-11-12 PROCEDURE — 99204 OFFICE O/P NEW MOD 45 MIN: CPT

## 2019-11-12 RX ORDER — RANITIDINE 150 MG/1
150 TABLET ORAL
Refills: 0 | Status: DISCONTINUED | COMMUNITY
End: 2019-11-12

## 2019-11-12 RX ORDER — DULOXETINE HYDROCHLORIDE 60 MG/1
60 CAPSULE, DELAYED RELEASE ORAL
Refills: 0 | Status: DISCONTINUED | COMMUNITY
End: 2019-11-12

## 2019-11-12 RX ORDER — CELECOXIB 200 MG/1
200 CAPSULE ORAL
Refills: 0 | Status: DISCONTINUED | COMMUNITY
End: 2019-11-12

## 2019-11-12 RX ORDER — HYDROCORTISONE 2.5% 25 MG/G
2.5 CREAM TOPICAL
Qty: 3 | Refills: 1 | Status: DISCONTINUED | COMMUNITY
Start: 2019-08-26 | End: 2019-11-12

## 2019-11-12 NOTE — REVIEW OF SYSTEMS
[Feeling Poorly] : feeling poorly [Feeling Tired] : feeling tired [As Noted in HPI] : as noted in HPI [Abdominal Pain] : abdominal pain [Constipation] : constipation [Negative] : Heme/Lymph

## 2019-11-14 NOTE — ASSESSMENT
[FreeTextEntry1] : 43 y/o female here today with epigastric pain associated with nausea, along with constipation and intermittent RLQ abdominal pain. \par \par Impression:\par Epigastric pain likely due to increased nsaid use ?gastritis -start ppi now. \par RLQ abdominal pain/constipation, ?IBS-C. \par \par Plan:\par Start miralax as needed and ppi. \par GERD diet and decrease dose of nsaids. \par Plan for egd and colonoscopy.

## 2019-11-14 NOTE — PHYSICAL EXAM
[General Appearance - Alert] : alert [Sclera] : the sclera and conjunctiva were normal [General Appearance - In No Acute Distress] : in no acute distress [PERRL With Normal Accommodation] : pupils were equal in size, round, and reactive to light [Extraocular Movements] : extraocular movements were intact [Auscultation Breath Sounds / Voice Sounds] : lungs were clear to auscultation bilaterally [Heart Rate And Rhythm] : heart rate was normal and rhythm regular [Heart Sounds Gallop] : no gallops [Heart Sounds] : normal S1 and S2 [Murmurs] : no murmurs [Heart Sounds Pericardial Friction Rub] : no pericardial rub [Bowel Sounds] : normal bowel sounds [Abdomen Tenderness] : non-tender [] : no hepato-splenomegaly [Abdomen Soft] : soft [Abdomen Mass (___ Cm)] : no abdominal mass palpated [Musculoskeletal - Swelling] : no joint swelling seen [Abnormal Walk] : normal gait [Nail Clubbing] : no clubbing  or cyanosis of the fingernails [Motor Tone] : muscle strength and tone were normal [Oriented To Time, Place, And Person] : oriented to person, place, and time [Affect] : the affect was normal [Impaired Insight] : insight and judgment were intact [FreeTextEntry1] : + epigastric tenderness.

## 2019-11-14 NOTE — HISTORY OF PRESENT ILLNESS
[de-identified] : 43 y/o Welsh speaking female, Erin assisting with translation.  Presents in office with c/o epigastric pain that is similar to her gastritis that she had several years ago.  Was started on zantac without any relief. She now reports the epigastric burning is awaiting her in the middle of the night associated with some nausea. She reports recently being on increased doses of NSAIDs due to neck pain.  Denies any melena.  Also, notes RLQ abdominal pain that is intermittent. She reports new constipation that is requires, fiber and prune juice. States only moving her bowels once every 2-4 days.  It is associated with abdominal cramping ?IBS  Denies any fevers, rectal bleeding.  Denies family hx of colon cancer.

## 2019-11-27 ENCOUNTER — RX CHANGE (OUTPATIENT)
Age: 44
End: 2019-11-27

## 2019-11-27 ENCOUNTER — APPOINTMENT (OUTPATIENT)
Dept: GASTROENTEROLOGY | Facility: AMBULATORY MEDICAL SERVICES | Age: 44
End: 2019-11-27
Payer: COMMERCIAL

## 2019-11-27 ENCOUNTER — RESULT REVIEW (OUTPATIENT)
Age: 44
End: 2019-11-27

## 2019-11-27 PROCEDURE — 45378 DIAGNOSTIC COLONOSCOPY: CPT

## 2019-11-27 PROCEDURE — 43239 EGD BIOPSY SINGLE/MULTIPLE: CPT

## 2020-02-27 ENCOUNTER — APPOINTMENT (OUTPATIENT)
Dept: FAMILY MEDICINE | Facility: CLINIC | Age: 45
End: 2020-02-27
Payer: COMMERCIAL

## 2020-02-27 VITALS
RESPIRATION RATE: 15 BRPM | OXYGEN SATURATION: 98 % | HEART RATE: 63 BPM | SYSTOLIC BLOOD PRESSURE: 106 MMHG | WEIGHT: 157 LBS | BODY MASS INDEX: 27.82 KG/M2 | HEIGHT: 63 IN | DIASTOLIC BLOOD PRESSURE: 60 MMHG

## 2020-02-27 DIAGNOSIS — T30.4 CORROSION OF UNSPECIFIED BODY REGION, UNSPECIFIED DEGREE: ICD-10-CM

## 2020-02-27 DIAGNOSIS — G43.909 MIGRAINE, UNSPECIFIED, NOT INTRACTABLE, W/OUT STATUS MIGRAINOSUS: ICD-10-CM

## 2020-02-27 DIAGNOSIS — M54.81 OCCIPITAL NEURALGIA: ICD-10-CM

## 2020-02-27 PROCEDURE — 99214 OFFICE O/P EST MOD 30 MIN: CPT

## 2020-02-27 RX ORDER — PANTOPRAZOLE 40 MG/1
40 TABLET, DELAYED RELEASE ORAL DAILY
Qty: 90 | Refills: 3 | Status: DISCONTINUED | COMMUNITY
Start: 2019-11-12 | End: 2019-12-27

## 2020-02-27 RX ORDER — ELETRIPTAN HYDROBROMIDE 20 MG/1
20 TABLET, FILM COATED ORAL
Qty: 18 | Refills: 0 | Status: DISCONTINUED | COMMUNITY
Start: 2019-11-05 | End: 2020-01-20

## 2020-02-27 RX ORDER — NORTRIPTYLINE HYDROCHLORIDE 25 MG/1
25 CAPSULE ORAL
Qty: 90 | Refills: 0 | Status: DISCONTINUED | COMMUNITY
Start: 2019-03-14 | End: 2020-01-21

## 2020-02-27 NOTE — ASSESSMENT
[FreeTextEntry1] : Hemicrania/ :eft occipital neuralgia/ Headache\par stay on Nortriptyline to 25 mg\par Indocin helped according to neurology note.\par Was supposed to F/U for nerve block. Advised to follow up and try the nerve block\par Chemical burn: Silvadene cream.\par SOTO positive/ Sjogren: follow up with Rheum. Continue Hydroxychloroquine. \par

## 2020-02-27 NOTE — PHYSICAL EXAM
[Normal] : soft, non-tender, non-distended, no masses palpated, no HSM and normal bowel sounds [Normal Anterior Cervical Nodes] : no anterior cervical lymphadenopathy [No CVA Tenderness] : no CVA  tenderness [No Spinal Tenderness] : no spinal tenderness [No Joint Swelling] : no joint swelling [Grossly Normal Strength/Tone] : grossly normal strength/tone [Normal Gait] : normal gait [Normal Affect] : the affect was normal [Normal Insight/Judgement] : insight and judgment were intact [de-identified] : patch discoloration on left side of neck. Redness on the left ear lobe

## 2020-02-27 NOTE — HISTORY OF PRESENT ILLNESS
[FreeTextEntry1] : follow up [de-identified] : Seen by Neurologist Dr Dunbar SB. \par Had trigger point injection was advised to stop cyclobenzaprine, but patient stopped all medications, including Nortriptyline\par She stopped Hydroxychloroquin a couple of months ago on her own.\par She has 3 wisdom teeth removed, Has some pain since then. Pain on the left side of face and head. Sharp pins and needles with some numbness. Used ? alohol and put a ? patch 2 days ago which resulted in pain and redness on the side of the neck and left ear. \par Sore throat for 2-3 days, no fever. No cough\par

## 2020-02-27 NOTE — REVIEW OF SYSTEMS
[Nasal Discharge] : no nasal discharge [Postnasal Drip] : postnasal drip [Cough] : cough [Abdominal Pain] : abdominal pain [Nausea] : no nausea [Constipation] : no constipation [Diarrhea] : diarrhea [Vomiting] : no vomiting [Heartburn] : no heartburn [Melena] : no melena [Joint Pain] : no joint pain [Joint Stiffness] : no joint stiffness [Joint Swelling] : no joint swelling [Muscle Weakness] : no muscle weakness [Muscle Pain] : no muscle pain [Back Pain] : no back pain [Negative] : Heme/Lymph

## 2020-07-27 ENCOUNTER — APPOINTMENT (OUTPATIENT)
Dept: FAMILY MEDICINE | Facility: CLINIC | Age: 45
End: 2020-07-27
Payer: COMMERCIAL

## 2020-07-27 ENCOUNTER — NON-APPOINTMENT (OUTPATIENT)
Age: 45
End: 2020-07-27

## 2020-07-27 VITALS
RESPIRATION RATE: 16 BRPM | SYSTOLIC BLOOD PRESSURE: 110 MMHG | WEIGHT: 163 LBS | TEMPERATURE: 98.5 F | HEART RATE: 85 BPM | DIASTOLIC BLOOD PRESSURE: 68 MMHG | OXYGEN SATURATION: 97 % | BODY MASS INDEX: 28.88 KG/M2 | HEIGHT: 63 IN

## 2020-07-27 DIAGNOSIS — R07.89 OTHER CHEST PAIN: ICD-10-CM

## 2020-07-27 PROCEDURE — 99214 OFFICE O/P EST MOD 30 MIN: CPT | Mod: 25

## 2020-07-27 PROCEDURE — 93000 ELECTROCARDIOGRAM COMPLETE: CPT

## 2020-07-27 RX ORDER — HYDROXYCHLOROQUINE SULFATE 400 MG/1
TABLET ORAL
Refills: 0 | Status: DISCONTINUED | COMMUNITY
End: 2020-02-27

## 2020-07-27 NOTE — PHYSICAL EXAM
[Normal] : normal rate, regular rhythm, normal S1 and S2 and no murmur heard [Normal Anterior Cervical Nodes] : no anterior cervical lymphadenopathy [No CVA Tenderness] : no CVA  tenderness [No Spinal Tenderness] : no spinal tenderness [No Joint Swelling] : no joint swelling [Grossly Normal Strength/Tone] : grossly normal strength/tone [Normal Gait] : normal gait [Normal Affect] : the affect was normal [Normal Insight/Judgement] : insight and judgment were intact [de-identified] : patch discoloration on left side of neck. Redness on the left ear lobe

## 2020-07-27 NOTE — HISTORY OF PRESENT ILLNESS
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 \par Headache, Medication prescribed did not help.\par She states she is scheduled for injections Wednesday.\par Had trigger point injection was advised to stop cyclobenzaprine, but patient stopped all medications, including Nortriptyline\par She stopped Hydroxychloroquine a couple of months ago on her own.\par \par \par

## 2020-07-27 NOTE — REVIEW OF SYSTEMS
[Negative] : Heme/Lymph [Nasal Discharge] : no nasal discharge [Postnasal Drip] : no postnasal drip [Cough] : no cough [Abdominal Pain] : no abdominal pain [Nausea] : no nausea [Constipation] : no constipation [Diarrhea] : diarrhea [Vomiting] : no vomiting [Heartburn] : no heartburn [Melena] : no melena [Joint Pain] : no joint pain [Joint Stiffness] : no joint stiffness [Joint Swelling] : no joint swelling [Muscle Weakness] : no muscle weakness [Muscle Pain] : no muscle pain [Back Pain] : no back pain

## 2020-07-27 NOTE — ASSESSMENT
[FreeTextEntry1] : CP Atypical\par EKG NSR no acute changes\par Reproducible\par Costochondritis\par On MEloxicam\par Requesting Cardiology referral\par

## 2020-09-16 ENCOUNTER — NON-APPOINTMENT (OUTPATIENT)
Age: 45
End: 2020-09-16

## 2020-09-16 ENCOUNTER — APPOINTMENT (OUTPATIENT)
Dept: FAMILY MEDICINE | Facility: CLINIC | Age: 45
End: 2020-09-16
Payer: COMMERCIAL

## 2020-09-16 VITALS
WEIGHT: 160 LBS | RESPIRATION RATE: 14 BRPM | HEART RATE: 79 BPM | SYSTOLIC BLOOD PRESSURE: 108 MMHG | OXYGEN SATURATION: 98 % | HEIGHT: 63 IN | DIASTOLIC BLOOD PRESSURE: 70 MMHG | TEMPERATURE: 98.2 F | BODY MASS INDEX: 28.35 KG/M2

## 2020-09-16 DIAGNOSIS — G43.709 CHRONIC MIGRAINE W/OUT AURA, NOT INTRACTABLE, W/OUT STATUS MIGRAINOSUS: ICD-10-CM

## 2020-09-16 DIAGNOSIS — G89.4 CHRONIC PAIN SYNDROME: ICD-10-CM

## 2020-09-16 DIAGNOSIS — Z13.39 ENCOUNTER FOR SCREENING EXAM FOR OTHER MENTAL HEALTH AND BEHAVIORAL DISORDERS: ICD-10-CM

## 2020-09-16 DIAGNOSIS — Z23 ENCOUNTER FOR IMMUNIZATION: ICD-10-CM

## 2020-09-16 DIAGNOSIS — E66.3 OVERWEIGHT: ICD-10-CM

## 2020-09-16 DIAGNOSIS — Z00.00 ENCOUNTER FOR GENERAL ADULT MEDICAL EXAMINATION W/OUT ABNORMAL FINDINGS: ICD-10-CM

## 2020-09-16 DIAGNOSIS — R51 HEADACHE: ICD-10-CM

## 2020-09-16 DIAGNOSIS — R19.4 CHANGE IN BOWEL HABIT: ICD-10-CM

## 2020-09-16 DIAGNOSIS — Z13.31 ENCOUNTER FOR SCREENING FOR DEPRESSION: ICD-10-CM

## 2020-09-16 PROCEDURE — 90715 TDAP VACCINE 7 YRS/> IM: CPT

## 2020-09-16 PROCEDURE — G0442 ANNUAL ALCOHOL SCREEN 15 MIN: CPT

## 2020-09-16 PROCEDURE — 36415 COLL VENOUS BLD VENIPUNCTURE: CPT

## 2020-09-16 PROCEDURE — 99396 PREV VISIT EST AGE 40-64: CPT | Mod: 25

## 2020-09-16 PROCEDURE — 90686 IIV4 VACC NO PRSV 0.5 ML IM: CPT

## 2020-09-16 PROCEDURE — 90472 IMMUNIZATION ADMIN EACH ADD: CPT

## 2020-09-16 PROCEDURE — G0008: CPT | Mod: 59

## 2020-09-16 PROCEDURE — 93000 ELECTROCARDIOGRAM COMPLETE: CPT | Mod: 59

## 2020-09-16 RX ORDER — SODIUM SULFATE, POTASSIUM SULFATE, MAGNESIUM SULFATE 17.5; 3.13; 1.6 G/ML; G/ML; G/ML
17.5-3.13-1.6 SOLUTION, CONCENTRATE ORAL
Qty: 1 | Refills: 0 | Status: DISCONTINUED | COMMUNITY
Start: 2019-11-12 | End: 2020-09-16

## 2020-09-16 NOTE — COUNSELING
[Potential consequences of obesity discussed] : Potential consequences of obesity discussed [Encouraged to use exercise tracking device] : Encouraged to use exercise tracking device [Benefits of weight loss discussed] : Benefits of weight loss discussed [Weigh Self Weekly] : weigh self weekly [____ min/wk Activity] : [unfilled] min/wk activity [Decrease Portions] : decrease portions [Good understanding] : Patient has a good understanding of disease, goals and obesity follow-up plan

## 2020-09-16 NOTE — HEALTH RISK ASSESSMENT
[Very Good] : ~his/her~  mood as very good [No] : In the past 12 months have you used drugs other than those required for medical reasons? No [No falls in past year] : Patient reported no falls in the past year [0] : 2) Feeling down, depressed, or hopeless: Not at all (0) [Patient reported mammogram was normal] : Patient reported mammogram was normal [Patient reported PAP Smear was normal] : Patient reported PAP Smear was normal [None] : None [With Family] : lives with family [Employed] : employed [] :  [Sexually Active] : sexually active [Feels Safe at Home] : Feels safe at home [Carbon Monoxide Detector] : carbon monoxide detector [Smoke Detector] : smoke detector [Sunscreen] : uses sunscreen [Seat Belt] :  uses seat belt [With Patient/Caregiver] : With Patient/Caregiver [Designated Healthcare Proxy] : Designated healthcare proxy [Name: ___] : Health Care Proxy's Name: [unfilled]  [Relationship: ___] : Relationship: [unfilled] [] : No [Audit-CScore] : 0 [[id number]] : 0 [Change in mental status noted] : No change in mental status noted [High Risk Behavior] : no high risk behavior [Language] : denies difficulty with language [Reports changes in hearing] : Reports no changes in hearing [Reports changes in vision] : Reports no changes in vision [TB Exposure] : is not being exposed to tuberculosis [Reports changes in dental health] : Reports no changes in dental health [HIVDate] : 8/2019 [PapSmearDate] : 4/2018 [MammogramDate] : 4/2018 [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

## 2020-09-16 NOTE — HISTORY OF PRESENT ILLNESS
[FreeTextEntry1] : annual [de-identified] : Ms. YOEL AGUILERA is a 45 year old female presenting for an annual\par Hx of seasonal allergies. \par Took Claritin some relief but not better\par Hx of Sjogren`s. Seen by Rheumatologist ? Dr Villafana 3 months ago. \par Hx of chronic headaches, seen by Neurologist \par Reports that she started medication for Headaches. It helps. She got an injection ? Steroid.\par On Mobic and Gabapentin prescribed by Rheumatologist. Dr Alex bowser\par Abdominal Pain on and off for many years\par On Omeprazole 20 mg prn by GI\par Has long Hx of constipation taking Metamucil\par BM makes the pain better\par

## 2020-09-16 NOTE — ASSESSMENT
Nursing Note by Maurizio Gonzales at 02/13/18 09:52 AM     Author:  Maurizio Gonzales Service:  (none) Author Type:  Medical Records Staff     Filed:  02/13/18 09:52 AM Encounter Date:  2/12/2018 Status:  Signed     :  Maurizio Gonzales (Medical Records Staff)            Date Form Received by Disability:[KK1.1T] 2/ 12/ 2018[KK1.1M]   Authorization? [KK1.1T] yes[KK1.1M]   Date sent for auth:    Date auth returned:    Charge:[KK1.1T] no[KK1.1M]    Type of form:[KK1.1T] FMLA[KK1.1M]   Company:[KK1.1T] [employer][KK1.1M]   When form complete:[KK1.1T] Release to [facility][KK1.1M]   Comments[KK1.1T]          Revision History        User Key Date/Time User Provider Type Action    > KK1.1 02/13/18 09:52 AM Maurizio Gonzales Medical Records Staff Sign    M - Manual, T - Template [FreeTextEntry1] : Annual\par \par Chronic headaches: Reports doing well after injection.\par Not taking Nortriptyline\par reports Meloxicam and gapapentin prescribed by Rheum helps the headache\par \par Hx of Sjogren`s. Seen by Rheumatologist ? Dr Villafana 3 months ago. \par \par Hx of chronic headaches, seen by Neurologist Dr Alex bowser\par Reports that she started medication for Headaches. It helps. She got an injection ? Steroid.\par On Mobic and Gabapentin prescribed by Rheumatologist.\par \par GYN overdue\par \par Seasonal Allergies\par Claritin makes her tired\par Try Zyrtec or Allegra\par \par Flu vaccine administered.\par Adacel administered\par \par GERD: increase Omeprazole to 40 mg\par \par Constipation good relief with Metamucil

## 2020-09-16 NOTE — PHYSICAL EXAM
[Normal] : no carotid or abdominal bruits heard, no varicosities, pedal pulses are present, no peripheral edema, no extremity clubbing or cyanosis and no palpable aorta [Normal Anterior Cervical Nodes] : no anterior cervical lymphadenopathy [No CVA Tenderness] : no CVA  tenderness [No Spinal Tenderness] : no spinal tenderness [Grossly Normal Strength/Tone] : grossly normal strength/tone [No Joint Swelling] : no joint swelling [No Rash] : no rash [Normal Gait] : normal gait [Normal Insight/Judgement] : insight and judgment were intact [Normal Affect] : the affect was normal

## 2020-09-20 LAB
ALBUMIN SERPL ELPH-MCNC: 4.7 G/DL
ALP BLD-CCNC: 90 U/L
ALT SERPL-CCNC: 32 U/L
AMYLASE/CREAT SERPL: 84 U/L
ANION GAP SERPL CALC-SCNC: 14 MMOL/L
AST SERPL-CCNC: 18 U/L
BILIRUB SERPL-MCNC: <0.2 MG/DL
BUN SERPL-MCNC: 11 MG/DL
CALCIUM SERPL-MCNC: 9.4 MG/DL
CHLORIDE SERPL-SCNC: 100 MMOL/L
CHOLEST SERPL-MCNC: 213 MG/DL
CHOLEST/HDLC SERPL: 3 RATIO
CO2 SERPL-SCNC: 26 MMOL/L
CREAT SERPL-MCNC: 0.64 MG/DL
ESTIMATED AVERAGE GLUCOSE: 120 MG/DL
GLUCOSE SERPL-MCNC: 95 MG/DL
HBA1C MFR BLD HPLC: 5.8 %
HDLC SERPL-MCNC: 71 MG/DL
LDLC SERPL CALC-MCNC: 122 MG/DL
LPL SERPL-CCNC: 50 U/L
POTASSIUM SERPL-SCNC: 3.8 MMOL/L
PROT SERPL-MCNC: 6.6 G/DL
SODIUM SERPL-SCNC: 139 MMOL/L
T4 FREE SERPL-MCNC: 1.3 NG/DL
TRIGL SERPL-MCNC: 97 MG/DL
TSH SERPL-ACNC: 1.01 UIU/ML

## 2020-10-05 ENCOUNTER — TRANSCRIPTION ENCOUNTER (OUTPATIENT)
Age: 45
End: 2020-10-05

## 2020-12-03 ENCOUNTER — APPOINTMENT (OUTPATIENT)
Dept: GASTROENTEROLOGY | Facility: CLINIC | Age: 45
End: 2020-12-03
Payer: COMMERCIAL

## 2020-12-03 VITALS
HEART RATE: 72 BPM | HEIGHT: 63 IN | BODY MASS INDEX: 28 KG/M2 | SYSTOLIC BLOOD PRESSURE: 96 MMHG | WEIGHT: 158 LBS | DIASTOLIC BLOOD PRESSURE: 67 MMHG

## 2020-12-03 DIAGNOSIS — R10.31 RIGHT LOWER QUADRANT PAIN: ICD-10-CM

## 2020-12-03 DIAGNOSIS — G89.29 RIGHT LOWER QUADRANT PAIN: ICD-10-CM

## 2020-12-03 PROCEDURE — 99072 ADDL SUPL MATRL&STAF TM PHE: CPT

## 2020-12-03 PROCEDURE — 99212 OFFICE O/P EST SF 10 MIN: CPT

## 2020-12-03 NOTE — ASSESSMENT
[FreeTextEntry1] : 44 yo female with RLQ pain. Will obtain CT scan and follow up depending on results.

## 2020-12-03 NOTE — PHYSICAL EXAM
[General Appearance - Alert] : alert [General Appearance - In No Acute Distress] : in no acute distress [Auscultation Breath Sounds / Voice Sounds] : lungs were clear to auscultation bilaterally [Bowel Sounds] : normal bowel sounds [Abdomen Soft] : soft [Abdomen Tenderness] : non-tender [] : no hepato-splenomegaly [Abdomen Mass (___ Cm)] : no abdominal mass palpated [FreeTextEntry1] : soft, minimal tenderness without rebound; no peritoneal signs

## 2020-12-03 NOTE — HISTORY OF PRESENT ILLNESS
[de-identified] : Ms. YOEL AGUILERA is a 45 year old female with history of RLQ pain. Patient reports steady pain, radiating to back. No fevers, chills. Pain has persisted. GERD continues despite PPI. Upper endoscopy and colonoscopy last year. \par

## 2020-12-16 ENCOUNTER — APPOINTMENT (OUTPATIENT)
Dept: CT IMAGING | Facility: CLINIC | Age: 45
End: 2020-12-16
Payer: COMMERCIAL

## 2020-12-16 ENCOUNTER — OUTPATIENT (OUTPATIENT)
Dept: OUTPATIENT SERVICES | Facility: HOSPITAL | Age: 45
LOS: 1 days | End: 2020-12-16
Payer: COMMERCIAL

## 2020-12-16 DIAGNOSIS — Z90.710 ACQUIRED ABSENCE OF BOTH CERVIX AND UTERUS: Chronic | ICD-10-CM

## 2020-12-16 DIAGNOSIS — Z00.8 ENCOUNTER FOR OTHER GENERAL EXAMINATION: ICD-10-CM

## 2020-12-16 PROBLEM — Z01.419 ENCOUNTER FOR ANNUAL ROUTINE GYNECOLOGICAL EXAMINATION: Status: RESOLVED | Noted: 2018-04-17 | Resolved: 2020-12-16

## 2020-12-16 PROCEDURE — 74177 CT ABD & PELVIS W/CONTRAST: CPT | Mod: 26

## 2020-12-16 PROCEDURE — 74177 CT ABD & PELVIS W/CONTRAST: CPT

## 2021-07-31 NOTE — ED STATDOCS - ATTENDING CONTRIBUTION TO CARE
0 I, Elvira Martinez MD,  performed the initial face to face bedside interview with this patient regarding history of present illness, review of symptoms and relevant past medical, social and family history.  I completed an independent physical examination.  I was the initial provider who evaluated this patient. I have signed out the follow up of any pending tests (i.e. labs, radiological studies) to the ACP.  I have communicated the patient’s plan of care and disposition with the ACP.  The history, relevant review of systems, past medical and surgical history, medical decision making, and physical examination was documented by the scribe in my presence and I attest to the accuracy of the documentation.

## 2021-12-23 ENCOUNTER — APPOINTMENT (OUTPATIENT)
Dept: OBGYN | Facility: CLINIC | Age: 46
End: 2021-12-23

## 2022-01-24 ENCOUNTER — APPOINTMENT (OUTPATIENT)
Dept: OBGYN | Facility: CLINIC | Age: 47
End: 2022-01-24
Payer: COMMERCIAL

## 2022-01-24 VITALS
DIASTOLIC BLOOD PRESSURE: 60 MMHG | BODY MASS INDEX: 26.58 KG/M2 | SYSTOLIC BLOOD PRESSURE: 98 MMHG | HEIGHT: 63 IN | WEIGHT: 150 LBS

## 2022-01-24 DIAGNOSIS — Z01.419 ENCOUNTER FOR GYNECOLOGICAL EXAMINATION (GENERAL) (ROUTINE) W/OUT ABNORMAL FINDINGS: ICD-10-CM

## 2022-01-24 PROCEDURE — 99072 ADDL SUPL MATRL&STAF TM PHE: CPT

## 2022-01-24 PROCEDURE — 99396 PREV VISIT EST AGE 40-64: CPT

## 2022-01-24 RX ORDER — SILVER SULFADIAZINE 10 MG/G
1 CREAM TOPICAL TWICE DAILY
Qty: 1 | Refills: 0 | Status: DISCONTINUED | COMMUNITY
Start: 2020-02-27 | End: 2022-01-24

## 2022-01-24 RX ORDER — MELOXICAM 15 MG/1
15 TABLET ORAL
Qty: 15 | Refills: 0 | Status: DISCONTINUED | COMMUNITY
Start: 2020-07-27 | End: 2022-01-24

## 2022-01-24 NOTE — DISCUSSION/SUMMARY
[FreeTextEntry1] : 1) pap performed\par 2) patient advised to f/u for screening mammogram\par \par Return to office in one year, sooner prn

## 2022-01-24 NOTE — PHYSICAL EXAM
[Appropriately responsive] : appropriately responsive [Alert] : alert [No Acute Distress] : no acute distress [Oriented x3] : oriented x3 [Examination Of The Breasts] : a normal appearance [No Discharge] : no discharge [No Masses] : no breast masses were palpable [Labia Majora] : normal [Labia Minora] : normal [Normal] : normal [Uterine Adnexae] : normal

## 2022-01-24 NOTE — HISTORY OF PRESENT ILLNESS
[Patient reported colonoscopy was normal] : Patient reported colonoscopy was normal [Yes] : Patient has concerns regarding sex [Currently Active] : currently active [Men] : men [TextBox_4] : Adriana is a 47 y/o  who presents today for an annual exam.  \par \par She has a hysterectomy in 2012 2nd to fibroids.\par \par Her last mammogram was a while ago.\par \par She is up to date with her screening mammogram [ColonoscopyDate] : 2019 [TextBox_43] : next: 2029 [FreeTextEntry1] : 2012

## 2022-01-25 LAB — HPV HIGH+LOW RISK DNA PNL CVX: NOT DETECTED

## 2022-01-27 ENCOUNTER — OUTPATIENT (OUTPATIENT)
Dept: OUTPATIENT SERVICES | Facility: HOSPITAL | Age: 47
LOS: 1 days | End: 2022-01-27
Payer: COMMERCIAL

## 2022-01-27 ENCOUNTER — RESULT REVIEW (OUTPATIENT)
Age: 47
End: 2022-01-27

## 2022-01-27 ENCOUNTER — APPOINTMENT (OUTPATIENT)
Dept: MAMMOGRAPHY | Facility: CLINIC | Age: 47
End: 2022-01-27
Payer: COMMERCIAL

## 2022-01-27 DIAGNOSIS — Z90.710 ACQUIRED ABSENCE OF BOTH CERVIX AND UTERUS: Chronic | ICD-10-CM

## 2022-01-27 DIAGNOSIS — Z12.39 ENCOUNTER FOR OTHER SCREENING FOR MALIGNANT NEOPLASM OF BREAST: ICD-10-CM

## 2022-01-27 PROCEDURE — 77063 BREAST TOMOSYNTHESIS BI: CPT | Mod: 26

## 2022-01-27 PROCEDURE — 77067 SCR MAMMO BI INCL CAD: CPT

## 2022-01-27 PROCEDURE — 77063 BREAST TOMOSYNTHESIS BI: CPT

## 2022-01-27 PROCEDURE — 77067 SCR MAMMO BI INCL CAD: CPT | Mod: 26

## 2022-04-14 ENCOUNTER — APPOINTMENT (OUTPATIENT)
Dept: INTERNAL MEDICINE | Facility: CLINIC | Age: 47
End: 2022-04-14

## 2022-05-05 ENCOUNTER — APPOINTMENT (OUTPATIENT)
Dept: INTERNAL MEDICINE | Facility: CLINIC | Age: 47
End: 2022-05-05

## 2022-06-15 ENCOUNTER — NON-APPOINTMENT (OUTPATIENT)
Age: 47
End: 2022-06-15

## 2022-06-16 ENCOUNTER — TRANSCRIPTION ENCOUNTER (OUTPATIENT)
Age: 47
End: 2022-06-16

## 2022-06-17 ENCOUNTER — APPOINTMENT (OUTPATIENT)
Dept: DISASTER EMERGENCY | Facility: HOSPITAL | Age: 47
End: 2022-06-17

## 2022-06-17 ENCOUNTER — OUTPATIENT (OUTPATIENT)
Dept: OUTPATIENT SERVICES | Facility: HOSPITAL | Age: 47
LOS: 1 days | End: 2022-06-17

## 2022-06-17 VITALS
HEART RATE: 89 BPM | DIASTOLIC BLOOD PRESSURE: 80 MMHG | OXYGEN SATURATION: 99 % | HEIGHT: 62 IN | TEMPERATURE: 98 F | RESPIRATION RATE: 19 BRPM | WEIGHT: 147.05 LBS | SYSTOLIC BLOOD PRESSURE: 134 MMHG

## 2022-06-17 VITALS
OXYGEN SATURATION: 99 % | RESPIRATION RATE: 18 BRPM | TEMPERATURE: 98 F | SYSTOLIC BLOOD PRESSURE: 110 MMHG | DIASTOLIC BLOOD PRESSURE: 73 MMHG | HEART RATE: 76 BPM

## 2022-06-17 DIAGNOSIS — Z90.710 ACQUIRED ABSENCE OF BOTH CERVIX AND UTERUS: Chronic | ICD-10-CM

## 2022-06-17 DIAGNOSIS — U07.1 COVID-19: ICD-10-CM

## 2022-06-17 RX ORDER — BEBTELOVIMAB 87.5 MG/ML
175 INJECTION, SOLUTION INTRAVENOUS ONCE
Refills: 0 | Status: COMPLETED | OUTPATIENT
Start: 2022-06-17 | End: 2022-06-17

## 2022-06-17 RX ADMIN — BEBTELOVIMAB 175 MILLIGRAM(S): 87.5 INJECTION, SOLUTION INTRAVENOUS at 08:15

## 2022-06-17 NOTE — MONOCLONAL ANTIBODY INFUSION - EXAM
CC: Monoclonal Antibody Infusion/COVID 19 Positive  47yFemale with Sjogren's syndrome, arthritis    exam/findings:  T(C): 36.7 (06-17-22 @ 07:57), Max: 36.7 (06-17-22 @ 07:57)  HR: 89 (06-17-22 @ 07:57) (89 - 89)  BP: 134/80 (06-17-22 @ 07:57) (134/80 - 134/80)  RR: 19 (06-17-22 @ 07:57) (19 - 19)  SpO2: 99% (06-17-22 @ 07:57) (99% - 99%)      PE:   Appearance: NAD	  HEENT:   Normal oral mucosa,   Lymphatic: No lymphadenopathy  Cardiovascular: Normal S1 S2, No JVD, No murmurs, No edema  Respiratory: Lungs clear to auscultation	  Gastrointestinal:  Soft, Non-tender, + BS	  Skin: warm and dry  Neurologic: Non-focal  Extremities: Normal range of motion

## 2022-06-17 NOTE — MONOCLONAL ANTIBODY INFUSION - ASSESSMENT AND PLAN
ASSESSMENT:  Pt is a -47 years old female with -Covid + on 6/16 referred by Dr. Mclaughlin who presents to infusion center for Monoclonal antibody infusion (Bebtelovimab). Patient is vaccinated with pfizer  Symptoms/ Criteria: fever, headache, bodyache, stomach pain, cough  Risk Profile includes: sjogrens syndrome, arthritis    PLAN:  - infusion procedure explained to patient   - Consent for monoclonal antibody infusion obtained   - Risk & benefits discussed/all questions answered  - Bebtelovimab 175mg IVP over 30 seconds  - observe patient for one hour post infusion and discharge home

## 2022-07-13 ENCOUNTER — EMERGENCY (EMERGENCY)
Facility: HOSPITAL | Age: 47
LOS: 0 days | Discharge: HOME | End: 2022-07-13
Attending: EMERGENCY MEDICINE
Payer: COMMERCIAL

## 2022-07-13 VITALS
RESPIRATION RATE: 17 BRPM | TEMPERATURE: 98 F | DIASTOLIC BLOOD PRESSURE: 71 MMHG | SYSTOLIC BLOOD PRESSURE: 116 MMHG | OXYGEN SATURATION: 100 % | HEART RATE: 62 BPM

## 2022-07-13 VITALS — WEIGHT: 149.03 LBS | HEIGHT: 62 IN

## 2022-07-13 DIAGNOSIS — Z90.710 ACQUIRED ABSENCE OF BOTH CERVIX AND UTERUS: Chronic | ICD-10-CM

## 2022-07-13 DIAGNOSIS — M35.00 SJOGREN SYNDROME, UNSPECIFIED: ICD-10-CM

## 2022-07-13 DIAGNOSIS — M19.90 UNSPECIFIED OSTEOARTHRITIS, UNSPECIFIED SITE: ICD-10-CM

## 2022-07-13 DIAGNOSIS — M25.562 PAIN IN LEFT KNEE: ICD-10-CM

## 2022-07-13 DIAGNOSIS — Z90.710 ACQUIRED ABSENCE OF BOTH CERVIX AND UTERUS: ICD-10-CM

## 2022-07-13 PROCEDURE — 99284 EMERGENCY DEPT VISIT MOD MDM: CPT

## 2022-07-13 PROCEDURE — 99284 EMERGENCY DEPT VISIT MOD MDM: CPT | Mod: 25

## 2022-07-13 PROCEDURE — 73562 X-RAY EXAM OF KNEE 3: CPT | Mod: LT

## 2022-07-13 PROCEDURE — 73562 X-RAY EXAM OF KNEE 3: CPT | Mod: 26,LT

## 2022-07-13 NOTE — CONSULT NOTE ADULT - SUBJECTIVE AND OBJECTIVE BOX
Orthopedic Surgery Consult Note    This is a 47y Female who presents to the ED today with c/o left knee pain, swelling and difficulty to bearing weight s/p mechanical fall. Patient states that she stepped in a pothole and injured the left knee. Afterwards felt instability when attempting to walk. No headstrike/LOC. Patient denies numbness/tingling in affected extremity. Denies other bone/joint complaints. Patient is an independent community ambulator at baseline.     PAST MEDICAL & SURGICAL HISTORY:  No pertinent past medical history      Arthritis      Sjogren&#x27;s disease      H/O abdominal hysterectomy        No Known Allergies      PE  LLE:   Skin intact, no erythema or ecchymosis  Moderate effusion noted  TTP over Medial joint line, over MCL; Mild TTP over lateral joint line  Valgus stress demonstrates significant laxity with no firm endpoint  Varus stress demonstrates mild laxity with firm endpoint  Lachman's negative; Posterior drawer negative  5/5 EHL/FHL/TA/Gastrocnemius  Mild Pain ROM in knee, AROM 5-95 degrees. No pain with ROM of Hip/Ankle  SILT DP/SP/S/T  Compartments soft and compressible  2+ DP     Secondary Survey:   No TTP over bony prominences, SILT, palpable pulses, full/painless A/PROM, compartments soft. No TTP over spinous processes or paraspinal muscles at C/T/L spine. No palpable step off. No other injuries or complaints.      Imaging:  X-rays of the Left knee demonstrate no acute fx/dislocation. Ossified salbador off lateral condyle    Assessment:  47y Female with a Left MCL Sprain    Plan:  - WBAT LLE in bulky garcia knee immobilizer.  - Pain control.  - ice, elevated   - Will discuss with attending orthopaedic trauma surgeon on call, and update plan accordingly Orthopedic Surgery Consult Note    This is a 47y Female who presents to the ED today with c/o left knee pain, swelling and difficulty to bearing weight s/p mechanical fall. Patient states that she stepped in a pothole and injured the left knee. Afterwards felt instability when attempting to walk. No headstrike/LOC. Patient denies numbness/tingling in affected extremity. Denies other bone/joint complaints. Patient is an independent community ambulator at baseline.     PAST MEDICAL & SURGICAL HISTORY:  No pertinent past medical history      Arthritis      Sjogren&#x27;s disease      H/O abdominal hysterectomy        No Known Allergies      PE  LLE:   Skin intact, no erythema or ecchymosis  Moderate effusion noted  TTP over Medial joint line, over MCL; Mild TTP over lateral joint line  Valgus stress demonstrates significant laxity with no firm endpoint  Varus stress demonstrates mild laxity with firm endpoint  Lachman's negative; Posterior drawer negative  5/5 EHL/FHL/TA/Gastrocnemius  Mild Pain ROM in knee, AROM 5-95 degrees. No pain with ROM of Hip/Ankle  SILT DP/SP/S/T  Compartments soft and compressible  2+ DP     Secondary Survey:   No TTP over bony prominences, SILT, palpable pulses, full/painless A/PROM, compartments soft. No TTP over spinous processes or paraspinal muscles at C/T/L spine. No palpable step off. No other injuries or complaints.      Imaging:  X-rays of the Left knee demonstrate no acute fx/dislocation. Ossified salbador off lateral condyle    Assessment:  47y Female with a Left MCL Sprain    Plan:  - WBAT LLE in bulky garcia knee immobilizer. crutches may be used as needed  - Pain control.  - ice, elevated   - discussed with Dr Valdes who is aware and agrees please FU with him in office next week, will schedule MRI  - discussed with pt, all questions answered, agree with plan

## 2022-07-13 NOTE — ED STATDOCS - NSFOLLOWUPINSTRUCTIONS_ED_ALL_ED_FT
Dolor divine de rodilla en los adultos    Acute Knee Pain, Adult      El dolor de rodilla puede tener muchas causas. A veces, el dolor de rodilla es repentino (divine) y puede deberse a daño, hinchazón o irritación de los músculos y tejidos que sostienen la rodilla.    A menudo, el dolor desaparece solo con el tiempo y con reposo. Si el dolor no desaparece, pueden hacerse pruebas para hallar patricia causa.      Siga estas instrucciones en patricia casa:      Si tiene tate rodillera o un dispositivo ortopédico:      •Use la rodillera o el dispositivo ortopédico nigel se lo haya indicado el médico. Quíteselos solamente nigel se lo haya indicado el médico.    •Aflójeselos si los dedos del pie:  •Hormiguean.      •Se adormecen.      •Se tornan fríos y de color rissa.        •Manténgalos limpios.    •Si la rodillera o el dispositivo ortopédico no son impermeables:  •No deje que se mojen.      •Cúbralos con un envoltorio hermético cuando tome un baño de inmersión o tate ducha.        Actividad     •Descanse la rodilla.      • No lorenzo cosas que le causen dolor o que lo intensifiquen.      •Evite las actividades en las que ambos pies no estén en contacto con el piso al mismo tiempo (actividades de alto impacto). Algunos ejemplos son correr, saltar la soga y hacer beatris de tijera.      •Trabaje con un fisioterapeuta para crear un programa de ejercicios seguros, nigel le haya indicado el médico.        Control del dolor, la rigidez y la hinchazón    •Si se lo indican, aplique hielo sobre la rodilla. Para hacer esto:  •Si tiene tate rodillera o un dispositivo ortopédico que se puede quitar, proceda nigel se lo haya indicado el médico.      •Ponga el hielo en tate bolsa plástica.      •Coloque tate toalla entre la piel y la bolsa.      •Aplique el hielo susan 20 minutos, 2 o 3 veces por día.      •Retire el hielo si la piel se le pone de color sandra brillante. [place] es muy importante. Si no puede sentir dolor, calor o frío, tiene un mayor riesgo de que se dañe la dahiana.        •Si se lo indican, use tate venda elástica para ejercer presión (compresión) en la rodilla lesionada.      •Cuando esté sentado o acostado, eleve la rodilla por encima del nivel del corazón.      •Duerma con tate almohada debajo de la rodilla.      Indicaciones generales     •Use los medicamentos de venta rani y los recetados solamente nigel se lo haya indicado el médico.      • No fume ni consuma ningún producto que contenga nicotina o tabaco. Si necesita ayuda para dejar de consumir estos productos, consulte al médico.      •Si tiene sobrepeso, trabaje con patricia médico y un experto en alimentos (nutricionista) para establecer metas para bajar de peso. El sobrepeso puede aumentar el dolor de rodilla.      •Controle si hay algún cambio en allan síntomas.      •Cumpla con todas las visitas de seguimiento.        Comuníquese con un médico si:    •El dolor de rodilla no desaparece.      •El dolor de rodilla cambia o empeora.      •Tiene fiebre junto con dolor de rodilla.      •La rodilla está enrojecida o se siente caliente al tacto.      •La rodilla le letty o se le queda trabada.        Solicite ayuda de inmediato si:    •La rodilla se le hincha, y la hinchazón empeora.      •No puede  la rodilla.      •Tiene un dolor muy intenso en la rodilla que no se melody con analgésicos.        Resumen    •El dolor de rodilla puede tener muchas causas. A menudo, el dolor desaparece solo con el tiempo y con reposo.      •El médico puede hacerle estudios para averiguar la causa del dolor.      •Controle si hay algún cambio en allan síntomas. Alivie el dolor con descanso, medicamentos, actividad de poca intensidad y el uso de hielo.      •Solicite ayuda de inmediato si no puede  la rodilla o si el dolor de rodilla es muy intenso.      Esta información no tiene nigel fin reemplazar el consejo del médico. Asegúrese de hacerle al médico cualquier pregunta que tenga.

## 2022-07-13 NOTE — ED STATDOCS - PHYSICAL EXAMINATION
GEN - NAD; well appearing; A+O x3   HEAD - NC/AT     EYES - EOMI, no conjunctival pallor, no scleral icterus  ENT -   mucous membranes  moist , no discharge      NECK - Neck supple  PULM - CTA b/l,  symmetric breath sounds  COR -  RRR, S1 S2, no murmurs  ABD - , ND, NT, soft, no guarding, no rebound, no masses    BACK - no CVA tenderness, nontender spine     EXTREMS - no edema, no deformity, warm and well perfused, ttp to the medial aspect of the left knee   SKIN - no rash or bruising      NEUROLOGIC - alert, sensation nl, motor 5/5 RUE/LUE/RLE/LLE;

## 2022-07-13 NOTE — ED STATDOCS - CLINICAL SUMMARY MEDICAL DECISION MAKING FREE TEXT BOX
Pt sent from ortho office for knee MRI. Unclear if pt misunderstood instruction. Pt in brace and had OP XR. Will reach out to ortho as to why pt was sent to ED. No obvious acute pathology at this time. Pt sent from ortho office for knee MRI. Unclear if pt misunderstood instruction. Pt in brace and had OP XR. Will reach out to ortho as to why pt was sent to ED. No obvious acute pathology at this time.          Orthopedic resident Ady called and patient may follow up dext week with SANDRA Valdes.  Olimpia Christian PA-C

## 2022-07-13 NOTE — ED STATDOCS - PATIENT PORTAL LINK FT
You can access the FollowMyHealth Patient Portal offered by [facility] by registering at the following website: [email or web address]. By joining [email or web address]’s FollowMyHealth portal, you will also be able to view your health information using other applications (apps) compatible with our system.

## 2022-07-13 NOTE — ED STATDOCS - PROGRESS NOTE DETAILS
I spoke with orthopedics resident and they will come and consult.    Olimpia Christian PA-C Pt,. is a 47 year old female presents to the ED with left knee pain.  Pt. evaluated two days ago in the ED and discharged.  Pt. followed up with outpatient PA (Ava) and sent in for MRI.  Pt. in knee immobilizer.   I called PA and asked why she was sent back to the ED and was sent in for MRI.  He stressed that the knee was extremely lax and unstable.  Pt. ineligable for outpatient MRI due to insurance issues. Orthopedic resident Nba evaluated patient and ordered xrays.  Olimpia Christian PA-C Orthopedic resident Ady called and patient may follow up dext week with SANRDA Valdes.  Olimpia Christian PA-C Orthopedic resident Ady called and patient may follow up dext week with SANDRA Valdes.  No MRI in the ED.    Olimpia Christian PA-C

## 2022-07-13 NOTE — ED STATDOCS - CARE PROVIDER_API CALL
Hayder Valdes)  Orthopaedic Surgery  [address] [place] [address] [place] [address]  Phone: [phone]  Fax: [phone]  Follow Up Time:

## 2022-07-13 NOTE — ED STATDOCS - OBJECTIVE STATEMENT
46 yo female w/ no pertinent PMHx presents to the ED c/o left knee pain. Pt went to her PMD, had XR and was told to come to the ED to get an MRI. Pt fell on 7/11, injuring her left knee. No other complaints at this time.

## 2022-07-13 NOTE — ED STATDOCS - NS ED ATTENDING STATEMENT MOD
This was a shared visit with the ERNIE. I reviewed and verified the documentation and independently performed the documented:

## 2022-07-13 NOTE — ED ADULT NURSE NOTE - OBJECTIVE STATEMENT
Pt presented to the ER with c/o knee pain. Pt stated that she fell 3 days ago and was told to come here for a MRI.

## 2022-07-13 NOTE — ED ADULT TRIAGE NOTE - CHIEF COMPLAINT QUOTE
PT C/O LEFT KNEE PAIN S/P FALL 3 DAYS AGO, "MY DOCTOR SENT ME HERE FOR MRI."  PT AMBULATORY WITH CANE.

## 2022-12-13 ENCOUNTER — NON-APPOINTMENT (OUTPATIENT)
Age: 47
End: 2022-12-13

## 2023-01-12 ENCOUNTER — OUTPATIENT (OUTPATIENT)
Dept: OUTPATIENT SERVICES | Facility: HOSPITAL | Age: 48
LOS: 1 days | End: 2023-01-12
Payer: COMMERCIAL

## 2023-01-12 ENCOUNTER — APPOINTMENT (OUTPATIENT)
Dept: RADIOLOGY | Facility: CLINIC | Age: 48
End: 2023-01-12
Payer: COMMERCIAL

## 2023-01-12 ENCOUNTER — APPOINTMENT (OUTPATIENT)
Dept: RHEUMATOLOGY | Facility: CLINIC | Age: 48
End: 2023-01-12
Payer: COMMERCIAL

## 2023-01-12 VITALS
DIASTOLIC BLOOD PRESSURE: 72 MMHG | WEIGHT: 148 LBS | BODY MASS INDEX: 27.23 KG/M2 | OXYGEN SATURATION: 100 % | SYSTOLIC BLOOD PRESSURE: 118 MMHG | HEIGHT: 62 IN | TEMPERATURE: 97.6 F | HEART RATE: 100 BPM

## 2023-01-12 DIAGNOSIS — M79.2 NEURALGIA AND NEURITIS, UNSPECIFIED: ICD-10-CM

## 2023-01-12 DIAGNOSIS — M25.541 PAIN IN JOINTS OF RIGHT HAND: ICD-10-CM

## 2023-01-12 PROCEDURE — 99204 OFFICE O/P NEW MOD 45 MIN: CPT

## 2023-01-12 PROCEDURE — 73130 X-RAY EXAM OF HAND: CPT | Mod: 26,LT,RT

## 2023-01-12 PROCEDURE — 73130 X-RAY EXAM OF HAND: CPT

## 2023-01-12 RX ORDER — GABAPENTIN 400 MG/1
400 CAPSULE ORAL
Qty: 60 | Refills: 1 | Status: ACTIVE | COMMUNITY
Start: 2023-01-12 | End: 1900-01-01

## 2023-01-12 NOTE — ASSESSMENT
[FreeTextEntry1] : 47F with reported history of Sjogrens syndrome diagnosed approx 5 years ago due to sicca symptoms and arthritis; has been intermittently on HCQ (resumed last month at 200 mg QD, but occasionally takes 2 pills on an as needed basis); here for repeat evaluation and to transfer care. \par \par Will repeat Sjogrens antibodies today as well as check markers of inflammation (ESR/CRP) and tests for other autoimmune conditions including SLE and rheumatoid arthritis. \par \par Will also check Xrays and ultrasounds of bilateral hands given tenderness to multiple IP joints.\par Further plan to follow after results of the above are obtained.\par \par Patient advised to continue  mg QD for now.\par Refill for gabapentin 400 mg BID was given as well (has taken this already, knows to take the medication in afternoon prior to sleeping; also knows not to take it before driving).

## 2023-01-12 NOTE — REVIEW OF SYSTEMS
[Dry Eyes] : dryness of the eyes [As Noted in HPI] : as noted in HPI [Arthralgias] : arthralgias [Joint Pain] : joint pain [Joint Stiffness] : joint stiffness [Skin Lesions] : no skin lesions [Itching] : no itching [Muscle Weakness] : no muscle weakness [Negative] : Heme/Lymph [de-identified] : neuropathic pain in b/l hands, feet with mild numbness associated; lower back pain

## 2023-01-12 NOTE — HISTORY OF PRESENT ILLNESS
[Dry Mouth] : dry mouth [Arthralgias] : arthralgias [Difficulty Walking] : difficulty walking [Dry Eyes] : dry eyes [FreeTextEntry1] : 47F here for rheumatology evaluation for joint pains and sicca symptoms. \par \par Was under rheum treatment for Sjogrens syndrome previously, diagnosed approx 5 years ago, due to arthritis, but decided to switch doctors. Previously under care of Dr. Emerald Johnson (rheumatologist).\par \par 5 years ago, the joint pain was in the hands, elbows, knees, and spine. The pain is continuing, but a little different now- hands feel numb, and soles of feet feel as if they are on fire. At nights when lying down, knees hurt a lot. Pain in the hips too. Arms feel numb. Occasional swelling in hands and knees, but not currently. \par in 12/2022 was in urgent care and was treated for severe migraine headaches. \par \par Gets dry eyes and dry mouth. gets nosebleeds often. Was recommended to buy eye drops for dry eyes. She takes chewable medications for the dry mouth (doesn’t know the name). Has been helping. Eye drops help as well. Denies Raynauds but white/blue discoloration associated with numbness. \par reports eye infections often- has optho appointment next week. \par reports getting COVID infection four times; most recently around Christmas of 2022; first time was initial wave. \par received monoclonal antibodies. \par \par 3 prior pregnancies, 1 miscarriage at 5 months. \par no hx of DVT/PE in self or family. \par No family history of autoimmune disease. \par \par Pertinent meds: \par \par Currently on  mg QD (resumed a month ago; had suspended it at onset of the COVID panedemic); sometimes takes BID when she feels a lot of pain, 1-2x/week). \par Diclofenac 75 mg QD (PRN)- doesn’t take all the time, can go even one week without taking. \par gabapentin 400 mg QD\par \par \par  [Weight Loss] : no weight loss [Fever] : no fever [Chills] : no chills [Malar Facial Rash] : no malar facial rash [Skin Lesions] : no lesions [Skin Nodules] : no skin nodules [Oral Ulcers] : no oral ulcers [Dysphagia] : no dysphagia [Muscle Weakness] : no muscle weakness [Muscle Spasms] : no muscle spasms [Muscle Cramping] : no muscle cramping [Visual Changes] : no visual changes [Eye Pain] : no eye pain [Eye Redness] : no eye redness

## 2023-01-12 NOTE — REASON FOR VISIT
[Other: ______] : provided by KEVYN [Initial Eval - Existing Diagnosis] : an initial evaluation of an existing diagnosis [FreeTextEntry1] : Sjogrens [Interpreters_IDNumber] : [id number] [Interpreters_FullName] : Ismael  [TWNoteComboBox1] : [place]

## 2023-01-12 NOTE — PHYSICAL EXAM
[General Appearance - Alert] : alert [General Appearance - In No Acute Distress] : in no acute distress [General Appearance - Well Developed] : well developed [General Appearance - Well-Appearing] : healthy appearing [Sclera] : the sclera and conjunctiva were normal [Extraocular Movements] : extraocular movements were intact [Respiration, Rhythm And Depth] : normal respiratory rhythm and effort [Exaggerated Use Of Accessory Muscles For Inspiration] : no accessory muscle use [Auscultation Breath Sounds / Voice Sounds] : lungs were clear to auscultation bilaterally [Heart Rate And Rhythm] : heart rate was normal and rhythm regular [Heart Sounds] : normal S1 and S2 [Heart Sounds Gallop] : no gallops [Murmurs] : no murmurs [Heart Sounds Pericardial Friction Rub] : no pericardial rub [Edema] : there was no peripheral edema [FreeTextEntry1] : tenderness in multiple MCP, PIP and DIP joints in hands bilaterally, but no synovitis noted; normal ROM of all joints except for R. foot (had surgery done previously, unable to plantarflex R foot [Skin Color & Pigmentation] : normal skin color and pigmentation [] : no rash [Skin Lesions] : no skin lesions [No Focal Deficits] : no focal deficits [Oriented To Time, Place, And Person] : oriented to person, place, and time [Affect] : the affect was normal [Mood] : the mood was normal

## 2023-01-14 ENCOUNTER — LABORATORY RESULT (OUTPATIENT)
Age: 48
End: 2023-01-14

## 2023-01-17 ENCOUNTER — OUTPATIENT (OUTPATIENT)
Dept: OUTPATIENT SERVICES | Facility: HOSPITAL | Age: 48
LOS: 1 days | End: 2023-01-17
Payer: COMMERCIAL

## 2023-01-17 ENCOUNTER — APPOINTMENT (OUTPATIENT)
Dept: ULTRASOUND IMAGING | Facility: CLINIC | Age: 48
End: 2023-01-17
Payer: COMMERCIAL

## 2023-01-17 ENCOUNTER — RESULT REVIEW (OUTPATIENT)
Age: 48
End: 2023-01-17

## 2023-01-17 DIAGNOSIS — Z90.710 ACQUIRED ABSENCE OF BOTH CERVIX AND UTERUS: Chronic | ICD-10-CM

## 2023-01-17 DIAGNOSIS — M25.541 PAIN IN JOINTS OF RIGHT HAND: ICD-10-CM

## 2023-01-17 PROCEDURE — 76882 US LMTD JT/FCL EVL NVASC XTR: CPT | Mod: 26,RT,76

## 2023-01-17 PROCEDURE — 76882 US LMTD JT/FCL EVL NVASC XTR: CPT

## 2023-01-18 LAB
ANA PAT FLD IF-IMP: ABNORMAL
ANA SER IF-ACNC: ABNORMAL
ANCA AB SER-IMP: ABNORMAL
APPEARANCE: CLEAR
BILIRUBIN URINE: NEGATIVE
BLOOD URINE: NEGATIVE
C-ANCA SER-ACNC: NEGATIVE
C3 SERPL-MCNC: 141 MG/DL
C4 SERPL-MCNC: 35 MG/DL
CCP AB SER IA-ACNC: <8 UNITS
COLOR: YELLOW
CREAT SPEC-SCNC: 131 MG/DL
CREAT/PROT UR: 0.1 RATIO
DSDNA AB SER-ACNC: <12 IU/ML
ENA RNP AB SER IA-ACNC: 1.2 AL
ENA SM AB SER IA-ACNC: <0.2 AL
ENA SS-A AB SER IA-ACNC: 1.2 AL
ENA SS-B AB SER IA-ACNC: <0.2 AL
GLUCOSE QUALITATIVE U: NEGATIVE
KETONES URINE: NEGATIVE
LEUKOCYTE ESTERASE URINE: ABNORMAL
NITRITE URINE: NEGATIVE
P-ANCA TITR SER IF: NEGATIVE
PH URINE: 6.5
PROT UR-MCNC: 8 MG/DL
PROTEIN URINE: NORMAL
RF+CCP IGG SER-IMP: NEGATIVE
RHEUMATOID FACT SER QL: <10 IU/ML
SPECIFIC GRAVITY URINE: 1.02
URATE SERPL-MCNC: 4.1 MG/DL
UROBILINOGEN URINE: NORMAL

## 2023-02-01 ENCOUNTER — APPOINTMENT (OUTPATIENT)
Dept: OBGYN | Facility: CLINIC | Age: 48
End: 2023-02-01
Payer: COMMERCIAL

## 2023-02-01 VITALS
HEIGHT: 62 IN | WEIGHT: 151 LBS | BODY MASS INDEX: 27.79 KG/M2 | DIASTOLIC BLOOD PRESSURE: 60 MMHG | SYSTOLIC BLOOD PRESSURE: 104 MMHG

## 2023-02-01 DIAGNOSIS — Z12.39 ENCOUNTER FOR OTHER SCREENING FOR MALIGNANT NEOPLASM OF BREAST: ICD-10-CM

## 2023-02-01 DIAGNOSIS — R10.33 PERIUMBILICAL PAIN: ICD-10-CM

## 2023-02-01 DIAGNOSIS — Z01.411 ENCOUNTER FOR GYNECOLOGICAL EXAMINATION (GENERAL) (ROUTINE) WITH ABNORMAL FINDINGS: ICD-10-CM

## 2023-02-01 DIAGNOSIS — Z12.4 ENCOUNTER FOR SCREENING FOR MALIGNANT NEOPLASM OF CERVIX: ICD-10-CM

## 2023-02-01 PROCEDURE — 99396 PREV VISIT EST AGE 40-64: CPT

## 2023-02-01 NOTE — HISTORY OF PRESENT ILLNESS
[Currently Active] : currently active [Men] : men [TextBox_4] : Adriana is a 48 y/o  who presents today for an annual exam.  She has c/o today at the site of her belly button for the past month.  She saw her PCP last week and was not found to have findings consistent with a hernia. she states that the pain has been persistent but feels harder at times.\par \par She had a hysterectomy in  2nd to uterine fibroids.\par \par She is due for a mammogram [Mammogramdate] : 1/27/22 [FreeTextEntry1] : hysterectomy

## 2023-02-01 NOTE — DISCUSSION/SUMMARY
[FreeTextEntry1] : 1) pap performed\par 2) rx for screening mammo issued\par 3) rx for abdominal u/s issued for further evaluation of umbilical pain\par \par Return to office in one year, sooner prn

## 2023-02-01 NOTE — PHYSICAL EXAM
[Appropriately responsive] : appropriately responsive [Alert] : alert [No Acute Distress] : no acute distress [No Lymphadenopathy] : no lymphadenopathy [Oriented x3] : oriented x3 [Examination Of The Breasts] : a normal appearance [No Discharge] : no discharge [No Masses] : no breast masses were palpable [Labia Majora] : normal [Labia Minora] : normal [Normal] : normal [Absent] : absent [Uterine Adnexae] : normal

## 2023-02-02 LAB — HPV HIGH+LOW RISK DNA PNL CVX: NOT DETECTED

## 2023-02-04 LAB — CYTOLOGY CVX/VAG DOC THIN PREP: ABNORMAL

## 2023-02-28 ENCOUNTER — APPOINTMENT (OUTPATIENT)
Dept: RHEUMATOLOGY | Facility: CLINIC | Age: 48
End: 2023-02-28
Payer: COMMERCIAL

## 2023-02-28 VITALS
SYSTOLIC BLOOD PRESSURE: 100 MMHG | TEMPERATURE: 98 F | HEART RATE: 75 BPM | DIASTOLIC BLOOD PRESSURE: 60 MMHG | BODY MASS INDEX: 28.52 KG/M2 | OXYGEN SATURATION: 100 % | WEIGHT: 155 LBS | HEIGHT: 62 IN

## 2023-02-28 DIAGNOSIS — M54.50 LOW BACK PAIN, UNSPECIFIED: ICD-10-CM

## 2023-02-28 DIAGNOSIS — M79.643 PAIN IN UNSPECIFIED HAND: ICD-10-CM

## 2023-02-28 PROCEDURE — 99214 OFFICE O/P EST MOD 30 MIN: CPT

## 2023-02-28 NOTE — REVIEW OF SYSTEMS
[Dry Eyes] : dryness of the eyes [As Noted in HPI] : as noted in HPI [Arthralgias] : arthralgias [Joint Pain] : joint pain [Joint Stiffness] : joint stiffness [Skin Lesions] : no skin lesions [Negative] : Heme/Lymph [FreeTextEntry4] : dry mouth  [FreeTextEntry9] : back pain and b/l hand pain

## 2023-02-28 NOTE — HISTORY OF PRESENT ILLNESS
[___ Week(s) Ago] : [unfilled] week(s) ago [FreeTextEntry1] : patient states she never got her HCQ at the pharmacy although the script was sent to [facility] 1/18/23. For this reason she ran out of HCQ pills 2 weeks ago. Reports pain in b/l hands, in PIP and DIP joints; also with pain in lumbar spine for past month, atraumatic. Takes Advil PRN for pain, up to four times a day with food if pain is particularly bad.\par \par Dry eyes and dry mouth persist- using artificial tears and has started biotene mouthwash.\par Some radicular pain down RLE as well- gabapentin 400 mg QD.

## 2023-02-28 NOTE — ASSESSMENT
[FreeTextEntry1] : 47F with Sjogrens syndrome here for followup. Not adherent to HCQ that was recommended after last visit because she states she could not get it from pharmacy, although it was sent. Currently with lower back pain for past month, in addition to b/l hand pain, and ongoing sicca symptoms. \par \par - HCQ sent again to patient's pharmacy, 200 mg/400 mg on alternating days. Pt advised to call if she cannot get her medications, instead of just stopping. \par - in the meantime, due to bilateral hand pain and tenderness on exam, prednisone short course was prescribed (20 mg QD x 1 week, followed by 10 mg QD x 1 week, then stop).\par - continue artificial tears and biotene products, if in 6 weeks symptoms of dry eyes and dry mouth are not improved, then  will consider cevimeline or pilocarpine.\par - Xrays of thoracic and lumbar spine ordered due to back pain with tenderness on spinal exam. \par - pt requested clobetasol renewal that was given by previous rheum for small rash on distal LLE (now negligible, no other rashes present)- I did not renew this, recommended that she see a dermatologist prior to using further clobetasol.\par \par Return in 6 weeks.

## 2023-02-28 NOTE — PHYSICAL EXAM
[Skin Color & Pigmentation] : normal skin color and pigmentation [Skin Lesions] : no skin lesions [General Appearance - Alert] : alert [General Appearance - In No Acute Distress] : in no acute distress [General Appearance - Well Developed] : well developed [General Appearance - Well-Appearing] : healthy appearing [Sclera] : the sclera and conjunctiva were normal [Extraocular Movements] : extraocular movements were intact [] : no respiratory distress [Exaggerated Use Of Accessory Muscles For Inspiration] : no accessory muscle use [Edema] : there was no peripheral edema [FreeTextEntry1] : tenderness of several PIP and IP joints in b/l hands on exam; no pain in other joints.  [No Focal Deficits] : no focal deficits [Oriented To Time, Place, And Person] : oriented to person, place, and time [Affect] : the affect was normal [Mood] : the mood was normal

## 2023-02-28 NOTE — REASON FOR VISIT
[Other: ______] : provided by KEVYN [Follow-Up: _____] : a [unfilled] follow-up visit [FreeTextEntry1] : Sjogrens syndrome [Interpreters_IDNumber] : [id number] [Interpreters_FullName] : Martin

## 2023-03-02 ENCOUNTER — OUTPATIENT (OUTPATIENT)
Dept: OUTPATIENT SERVICES | Facility: HOSPITAL | Age: 48
LOS: 1 days | End: 2023-03-02
Payer: COMMERCIAL

## 2023-03-02 ENCOUNTER — APPOINTMENT (OUTPATIENT)
Dept: ULTRASOUND IMAGING | Facility: CLINIC | Age: 48
End: 2023-03-02
Payer: COMMERCIAL

## 2023-03-02 ENCOUNTER — RESULT REVIEW (OUTPATIENT)
Age: 48
End: 2023-03-02

## 2023-03-02 ENCOUNTER — APPOINTMENT (OUTPATIENT)
Dept: MAMMOGRAPHY | Facility: CLINIC | Age: 48
End: 2023-03-02
Payer: COMMERCIAL

## 2023-03-02 DIAGNOSIS — Z90.710 ACQUIRED ABSENCE OF BOTH CERVIX AND UTERUS: Chronic | ICD-10-CM

## 2023-03-02 DIAGNOSIS — Z12.39 ENCOUNTER FOR OTHER SCREENING FOR MALIGNANT NEOPLASM OF BREAST: ICD-10-CM

## 2023-03-02 DIAGNOSIS — R10.33 PERIUMBILICAL PAIN: ICD-10-CM

## 2023-03-02 PROCEDURE — 77067 SCR MAMMO BI INCL CAD: CPT | Mod: 26

## 2023-03-02 PROCEDURE — 76700 US EXAM ABDOM COMPLETE: CPT

## 2023-03-02 PROCEDURE — 77063 BREAST TOMOSYNTHESIS BI: CPT

## 2023-03-02 PROCEDURE — 77063 BREAST TOMOSYNTHESIS BI: CPT | Mod: 26

## 2023-03-02 PROCEDURE — 77067 SCR MAMMO BI INCL CAD: CPT

## 2023-03-02 PROCEDURE — 76700 US EXAM ABDOM COMPLETE: CPT | Mod: 26

## 2023-03-06 ENCOUNTER — NON-APPOINTMENT (OUTPATIENT)
Age: 48
End: 2023-03-06

## 2023-04-04 ENCOUNTER — APPOINTMENT (OUTPATIENT)
Dept: RHEUMATOLOGY | Facility: CLINIC | Age: 48
End: 2023-04-04

## 2023-06-06 ENCOUNTER — APPOINTMENT (OUTPATIENT)
Dept: GASTROENTEROLOGY | Facility: CLINIC | Age: 48
End: 2023-06-06
Payer: COMMERCIAL

## 2023-06-06 VITALS
DIASTOLIC BLOOD PRESSURE: 65 MMHG | HEIGHT: 62 IN | SYSTOLIC BLOOD PRESSURE: 107 MMHG | BODY MASS INDEX: 26.68 KG/M2 | WEIGHT: 145 LBS | HEART RATE: 69 BPM

## 2023-06-06 DIAGNOSIS — R10.13 EPIGASTRIC PAIN: ICD-10-CM

## 2023-06-06 PROCEDURE — 99213 OFFICE O/P EST LOW 20 MIN: CPT

## 2023-06-06 NOTE — HISTORY OF PRESENT ILLNESS
[FreeTextEntry1] : Ms. YOEL AGUILERA is a 48 year old female with history of epigastric discomfort.  Patient has had upper abdominal pain in the past and has had endoscopy for evaluation.  Patient reports feeling of fullness and epigastric discomfort which radiates to the back.  Patient had a sonogram that showed a gallbladder polyp but no clear stones or gallbladder wall thickening.  Patient did take PPIs with some improvement.  Patient does report frequent vomiting after meals.\par

## 2023-06-06 NOTE — PHYSICAL EXAM

## 2023-06-06 NOTE — ASSESSMENT
[FreeTextEntry1] : 47 yo female with history of epigastric discomfort and vomiting.  Trial of sucralfate before meals.  Gastric emptying scan to evaluate for possible gastroparesis.  Follow-up three weeks.

## 2023-06-22 ENCOUNTER — NON-APPOINTMENT (OUTPATIENT)
Age: 48
End: 2023-06-22

## 2023-06-27 ENCOUNTER — APPOINTMENT (OUTPATIENT)
Dept: GASTROENTEROLOGY | Facility: CLINIC | Age: 48
End: 2023-06-27
Payer: COMMERCIAL

## 2023-06-27 VITALS
SYSTOLIC BLOOD PRESSURE: 102 MMHG | HEIGHT: 63 IN | HEART RATE: 59 BPM | DIASTOLIC BLOOD PRESSURE: 69 MMHG | WEIGHT: 147 LBS | BODY MASS INDEX: 26.05 KG/M2

## 2023-06-27 PROCEDURE — 99213 OFFICE O/P EST LOW 20 MIN: CPT

## 2023-06-27 NOTE — HISTORY OF PRESENT ILLNESS
[FreeTextEntry1] : Ms. YOEL AGUILERA is a 48 year old female with history o f bloating and upper abdominal discomfort.  Patient had recent gastric emptying scan that showed delayed emptying of liquids.  Prior endoscopy was unremarkable.  Medication including metoclopramide discussed with patient via .  Patient advised about side effects of medication.\par

## 2023-06-27 NOTE — PHYSICAL EXAM
[Alert] : alert [Healthy Appearing] : healthy appearing [Normal Voice/Communication] : normal voice/communication [No Acute Distress] : no acute distress [Sclera] : the sclera and conjunctiva were normal [Normal Lips/Gums] : the lips and gums were normal [Hearing Threshold Finger Rub Not [place]] : hearing was normal [Oropharynx] : the oropharynx was normal [Normal Appearance] : the appearance of the neck was normal [No Neck Mass] : no neck mass was observed [No Respiratory Distress] : no respiratory distress [No Acc Muscle Use] : no accessory muscle use [Respiration, Rhythm And Depth] : normal respiratory rhythm and effort [Auscultation Breath Sounds / Voice Sounds] : lungs were clear to auscultation bilaterally [Heart Rate And Rhythm] : heart rate was normal and rhythm regular [Normal S1, S2] : normal S1 and S2 [Murmurs] : no murmurs [Bowel Sounds] : normal bowel sounds [Abdomen Tenderness] : non-tender [No Masses] : no abdominal mass palpated [Abdomen Soft] : soft [] : no hepatosplenomegaly [Oriented To Time, Place, And Person] : oriented to person, place, and time

## 2023-06-27 NOTE — ASSESSMENT
[FreeTextEntry1] : 49 yo female with history of upper abdominal discomfort.  Patient had abnormal gastric\par emptying.  Trial of Reglan five mg before meals.  Patient advised to call immediately with any side effects.  Follow up one month.

## 2023-07-31 NOTE — ED ADULT NURSE NOTE - NSSUSCREENINGQ4_ED_ALL_ED
No Dosing weight (7/28) 141 lbs / 64.0 kg.  Most recent chart weight (11/1/22) 147 lbs.  Ideal Body Weight: 100 lbs / 45.5 kg +/-10%

## 2023-08-15 ENCOUNTER — APPOINTMENT (OUTPATIENT)
Dept: GASTROENTEROLOGY | Facility: CLINIC | Age: 48
End: 2023-08-15
Payer: COMMERCIAL

## 2023-08-15 VITALS
WEIGHT: 140 LBS | HEIGHT: 63 IN | SYSTOLIC BLOOD PRESSURE: 128 MMHG | BODY MASS INDEX: 24.8 KG/M2 | DIASTOLIC BLOOD PRESSURE: 80 MMHG

## 2023-08-15 DIAGNOSIS — Z79.899 OTHER LONG TERM (CURRENT) DRUG THERAPY: ICD-10-CM

## 2023-08-15 DIAGNOSIS — R11.2 NAUSEA WITH VOMITING, UNSPECIFIED: ICD-10-CM

## 2023-08-15 PROCEDURE — 99214 OFFICE O/P EST MOD 30 MIN: CPT | Mod: 25

## 2023-08-15 NOTE — REASON FOR VISIT
[Follow-up] : a follow-up of an existing diagnosis [[employer] ] : provided by [employer]   [Time Spent: ____ minutes] : Total time spent using  services: [unfilled] minutes. The patient's primary language is not English thus required  services. [Source: ______] : History obtained from [unfilled] [Interpreters_IDNumber] : [id number] [Interpreters_FullName] : Trevor [TWNoteComboBox1] : [place]ian

## 2023-08-15 NOTE — HISTORY OF PRESENT ILLNESS
[FreeTextEntry1] : Adriana Cummings is a 48 year old female presenting today for follow up visit for epigastric pain, feeling of fullness, with periods of vomiting. Since last visit, was diagnosed with gastroparesis via GES. prescribed reglan with some improvement of episodes but pt notes particularly with fruits vomiting episodes still occur. Is also still taking PPi therapy as instructed. Denies any bowel issues, reports her weight has been stable.

## 2023-08-15 NOTE — ASSESSMENT
[FreeTextEntry1] : Plan: Educated pt about dietary modifications for gastroparesis and importance of taking reglan at least 30 minutes prior to meals. She expressed understanding. She would like to schedule EGD because she is concerned, can offer since vomiting is persistent. Risks versus benefits as well as instructions reviewed, pt agrees to planned procedure. All questions answered, pt expressed understanding. I have spent 30 minutes on this encounter.

## 2023-08-15 NOTE — REVIEW OF SYSTEMS
[As Noted in HPI] : as noted in HPI [Abdominal Pain] : abdominal pain [Vomiting] : vomiting [Constipation] : no constipation [Diarrhea] : no diarrhea [Heartburn] : heartburn [Melena (black stool)] : no melena [Bleeding] : no bleeding [Fecal Incontinence (soiling)] : no fecal incontinence [Bloating (gassiness)] : no bloating [Negative] : Heme/Lymph

## 2023-09-25 ENCOUNTER — APPOINTMENT (OUTPATIENT)
Dept: RHEUMATOLOGY | Facility: CLINIC | Age: 48
End: 2023-09-25
Payer: COMMERCIAL

## 2023-09-25 VITALS
SYSTOLIC BLOOD PRESSURE: 118 MMHG | HEIGHT: 63 IN | WEIGHT: 141 LBS | BODY MASS INDEX: 24.98 KG/M2 | DIASTOLIC BLOOD PRESSURE: 70 MMHG | OXYGEN SATURATION: 99 % | TEMPERATURE: 97.4 F | HEART RATE: 70 BPM

## 2023-09-25 DIAGNOSIS — K31.84 GASTROPARESIS: ICD-10-CM

## 2023-09-25 DIAGNOSIS — M19.90 UNSPECIFIED OSTEOARTHRITIS, UNSPECIFIED SITE: ICD-10-CM

## 2023-09-25 LAB
ALBUMIN SERPL ELPH-MCNC: 4.5 G/DL
ALP BLD-CCNC: 72 U/L
ALT SERPL-CCNC: 13 U/L
ANION GAP SERPL CALC-SCNC: 10 MMOL/L
AST SERPL-CCNC: 16 U/L
BILIRUB SERPL-MCNC: 0.3 MG/DL
BUN SERPL-MCNC: 10 MG/DL
CALCIUM SERPL-MCNC: 9.5 MG/DL
CHLORIDE SERPL-SCNC: 106 MMOL/L
CK SERPL-CCNC: 65 U/L
CO2 SERPL-SCNC: 26 MMOL/L
CREAT SERPL-MCNC: 0.66 MG/DL
CREAT SPEC-SCNC: 55 MG/DL
CREAT/PROT UR: 0.1 RATIO
CRP SERPL-MCNC: <3 MG/L
EGFR: 107 ML/MIN/1.73M2
ERYTHROCYTE [SEDIMENTATION RATE] IN BLOOD BY WESTERGREN METHOD: 6 MM/HR
HCT VFR BLD CALC: 39.1 %
HGB BLD-MCNC: 12.7 G/DL
MCHC RBC-ENTMCNC: 29.7 PG
MCHC RBC-ENTMCNC: 32.5 GM/DL
MCV RBC AUTO: 91.4 FL
PLATELET # BLD AUTO: 271 K/UL
POTASSIUM SERPL-SCNC: 4.1 MMOL/L
PROT SERPL-MCNC: 6.6 G/DL
PROT UR-MCNC: 5 MG/DL
RBC # BLD: 4.28 M/UL
RBC # FLD: 12.9 %
SODIUM SERPL-SCNC: 142 MMOL/L
VIT B12 SERPL-MCNC: 928 PG/ML
WBC # FLD AUTO: 4.43 K/UL

## 2023-09-25 PROCEDURE — 99215 OFFICE O/P EST HI 40 MIN: CPT

## 2023-09-25 RX ORDER — SUCRALFATE 1 G/1
1 TABLET ORAL
Qty: 90 | Refills: 3 | Status: DISCONTINUED | COMMUNITY
Start: 2023-06-06 | End: 2023-09-25

## 2023-09-25 RX ORDER — PREDNISONE 10 MG/1
10 TABLET ORAL
Qty: 21 | Refills: 0 | Status: DISCONTINUED | COMMUNITY
Start: 2023-02-28 | End: 2023-09-25

## 2023-09-25 RX ORDER — METOCLOPRAMIDE 5 MG/1
5 TABLET ORAL 3 TIMES DAILY
Qty: 90 | Refills: 4 | Status: DISCONTINUED | COMMUNITY
Start: 2023-06-27 | End: 2023-09-25

## 2023-09-25 RX ORDER — OMEPRAZOLE 40 MG/1
40 CAPSULE, DELAYED RELEASE ORAL
Qty: 90 | Refills: 0 | Status: DISCONTINUED | COMMUNITY
Start: 2019-11-27 | End: 2023-09-25

## 2023-09-25 RX ORDER — GABAPENTIN 100 MG/1
100 CAPSULE ORAL TWICE DAILY
Qty: 180 | Refills: 3 | Status: DISCONTINUED | COMMUNITY
Start: 2020-07-27 | End: 2023-09-25

## 2023-09-26 PROBLEM — M19.90 ARTHRITIS: Status: ACTIVE | Noted: 2019-03-14

## 2023-09-26 LAB
C3 SERPL-MCNC: 124 MG/DL
C4 SERPL-MCNC: 30 MG/DL

## 2023-09-27 LAB
CENTROMERE IGG SER-ACNC: <0.2 AL
DSDNA AB SER-ACNC: <12 IU/ML
ENA RNP AB SER IA-ACNC: 1.1 AL
ENA SCL70 IGG SER IA-ACNC: <0.2 AL
ENA SM AB SER IA-ACNC: <0.2 AL
ENA SS-A AB SER IA-ACNC: 1.5 AL
ENA SS-B AB SER IA-ACNC: <0.2 AL

## 2023-09-28 LAB
ALBUMIN MFR SERPL ELPH: 64.9 %
ALBUMIN SERPL-MCNC: 4.3 G/DL
ALBUMIN/GLOB SERPL: 1.9 RATIO
ALPHA1 GLOB MFR SERPL ELPH: 3.3 %
ALPHA1 GLOB SERPL ELPH-MCNC: 0.2 G/DL
ALPHA2 GLOB MFR SERPL ELPH: 8.6 %
ALPHA2 GLOB SERPL ELPH-MCNC: 0.6 G/DL
B-GLOBULIN MFR SERPL ELPH: 10.9 %
B-GLOBULIN SERPL ELPH-MCNC: 0.7 G/DL
DEPRECATED KAPPA LC FREE/LAMBDA SER: 1.18 RATIO
GAMMA GLOB FLD ELPH-MCNC: 0.8 G/DL
GAMMA GLOB MFR SERPL ELPH: 12.3 %
HYDROXYCHLOROQUINE CONCENTRATION: 104 NG/ML
IGA SER QL IEP: 229 MG/DL
IGG SER QL IEP: 824 MG/DL
IGG4 SER-MCNC: 15 MG/DL
IGM SER QL IEP: 88 MG/DL
INTERPRETATION SERPL IEP-IMP: NORMAL
KAPPA LC CSF-MCNC: 1.2 MG/DL
KAPPA LC SERPL-MCNC: 1.41 MG/DL
M PROTEIN SPEC IFE-MCNC: NORMAL
PROT SERPL-MCNC: 6.6 G/DL
PROT SERPL-MCNC: 6.6 G/DL
RNA POLYMERASE III IGG: 6 UNITS

## 2023-09-29 LAB
ANTI-BETA2 GLYCOPROTEIN 1 IGG CONCENTRATION: 1 U/ML
ANTI-BETA2 GLYCOPROTEIN 1 IGM CONCENTRATION: <2.4 U/ML
ANTI-CARDIOLIPIN IGG CONCENTRATION: 1 GPL
ANTI-CARDIOLIPIN IGM CONCENTRATION: 4 MPL
ANTI-CENP IGG CONCENTRATION: <0.4 U/ML
ANTI-CYCLIC CITRULLINATED PEPTIDE IGG CONCENTRATION: 1 U/ML
ANTI-DOUBLE-STRANDED DNA IGG CONCENTRATION: 21 IU/ML
ANTI-JO-1 IGG CONCENTRATION: <0.3 U/ML
ANTI-NUCLEAR ANTIBODIES - CYTOPLASMIC PATTERN: NORMAL
ANTI-NUCLEAR ANTIBODIES - PRIMARY NUCLEAR PATTERN: NORMAL
ANTI-NUCLEAR ANTIBODIES - PRIMARY PATTERN TITER: NEGATIVE
ANTI-NUCLEAR ANTIBODIES IGG CONCENTRATION: 88 UNITS
ANTI-RNA POL III IGG CONCENTRATION: <0.7 U/ML
ANTI-RNP70 IGG CONCENTRATION: 1 U/ML
ANTI-RO52 IGG CONCENTRATION: 23 U/ML
ANTI-RO60 IGG CONCENTRATION: <0.4 U/ML
ANTI-SCL-70 IGG CONCENTRATION: <0.6 U/ML
ANTI-SMITH IGG CONCENTRATION: <0.7 U/ML
ANTI-SS-B (LA) IGG CONCENTRATION: <0.4 U/ML
ANTI-THYROGLOBULIN IGG CONCENTRATION: <12 IU/ML
ANTI-THYROID PEROXIDASE IGG CONCENTRATION: <4 IU/ML
ANTI-U1RNP IGG CONCENTRATION: 1 U/ML
AVISE LUPUS INDEX: 0
AVISE LUPUS RESULT: NORMAL
B-LYMPHOCYTE-BOUND C4D (BC4D) LEVEL: 20
ERYTHROCYTE-BOUND C4D (EC4D) LEVEL: 3
RHEUMATOID FACTOR (IGA) CONCENTRATION: 2 IU/ML
RHEUMATOID FACTOR (IGM) CONCENTRATION: 1 IU/ML

## 2023-10-06 ENCOUNTER — RESULT REVIEW (OUTPATIENT)
Age: 48
End: 2023-10-06

## 2023-10-06 ENCOUNTER — APPOINTMENT (OUTPATIENT)
Dept: GASTROENTEROLOGY | Facility: AMBULATORY MEDICAL SERVICES | Age: 48
End: 2023-10-06
Payer: COMMERCIAL

## 2023-10-06 ENCOUNTER — NON-APPOINTMENT (OUTPATIENT)
Age: 48
End: 2023-10-06

## 2023-10-06 PROCEDURE — 43239 EGD BIOPSY SINGLE/MULTIPLE: CPT

## 2023-10-25 LAB
EJ AB SER QL: NEGATIVE
ENA JO1 AB SER IA-ACNC: <20 UNITS
ENA PM/SCL AB SER-ACNC: <20 UNITS
ENA SM+RNP AB SER IA-ACNC: <20 UNITS
ENA SS-A IGG SER QL: 88 UNITS
FIBRILLARIN AB SER QL: NEGATIVE
KU AB SER QL: NEGATIVE
MDA-5 (P140)(CADM-140): <20 UNITS
MI2 AB SER QL: NEGATIVE
NXP-2 (P140): <20 UNITS
OJ AB SER QL: NEGATIVE
PL12 AB SER QL: NEGATIVE
PL7 AB SER QL: NEGATIVE
SRP AB SERPL QL: NEGATIVE
TIF GAMMA (P155/140): <20 UNITS
U2 SNRNP AB SER QL: NEGATIVE

## 2023-11-06 ENCOUNTER — APPOINTMENT (OUTPATIENT)
Dept: RHEUMATOLOGY | Facility: CLINIC | Age: 48
End: 2023-11-06
Payer: COMMERCIAL

## 2023-11-06 VITALS
HEART RATE: 78 BPM | SYSTOLIC BLOOD PRESSURE: 111 MMHG | OXYGEN SATURATION: 99 % | BODY MASS INDEX: 24.63 KG/M2 | DIASTOLIC BLOOD PRESSURE: 52 MMHG | TEMPERATURE: 97.1 F | HEIGHT: 63 IN | WEIGHT: 139 LBS

## 2023-11-06 DIAGNOSIS — Z79.899 OTHER LONG TERM (CURRENT) DRUG THERAPY: ICD-10-CM

## 2023-11-06 DIAGNOSIS — Z51.81 ENCOUNTER FOR THERAPEUTIC DRUG LVL MONITORING: ICD-10-CM

## 2023-11-06 DIAGNOSIS — R76.8 OTHER SPECIFIED ABNORMAL IMMUNOLOGICAL FINDINGS IN SERUM: ICD-10-CM

## 2023-11-06 DIAGNOSIS — M35.00 SICCA SYNDROME, UNSPECIFIED: ICD-10-CM

## 2023-11-06 DIAGNOSIS — M70.61 TROCHANTERIC BURSITIS, RIGHT HIP: ICD-10-CM

## 2023-11-06 DIAGNOSIS — M79.18 MYALGIA, OTHER SITE: ICD-10-CM

## 2023-11-06 DIAGNOSIS — M25.541 PAIN IN JOINTS OF RIGHT HAND: ICD-10-CM

## 2023-11-06 DIAGNOSIS — M65.30 TRIGGER FINGER, UNSPECIFIED FINGER: ICD-10-CM

## 2023-11-06 DIAGNOSIS — M25.542 PAIN IN JOINTS OF RIGHT HAND: ICD-10-CM

## 2023-11-06 PROCEDURE — 99215 OFFICE O/P EST HI 40 MIN: CPT

## 2023-11-08 RX ORDER — HYDROXYCHLOROQUINE SULFATE 200 MG/1
200 TABLET, FILM COATED ORAL
Qty: 90 | Refills: 1 | Status: ACTIVE | COMMUNITY
Start: 2023-01-18 | End: 1900-01-01

## 2023-11-08 RX ORDER — MELOXICAM 7.5 MG/1
7.5 TABLET ORAL
Qty: 180 | Refills: 3 | Status: ACTIVE | COMMUNITY
Start: 2023-09-26 | End: 1900-01-01

## 2023-11-09 PROBLEM — R76.8 ANA POSITIVE: Status: ACTIVE | Noted: 2019-03-14

## 2023-11-09 PROBLEM — M25.541 ARTHRALGIA OF BOTH HANDS: Status: ACTIVE | Noted: 2023-01-12

## 2023-11-09 PROBLEM — M65.30 TRIGGER FINGER OF RIGHT HAND, UNSPECIFIED FINGER: Status: ACTIVE | Noted: 2023-11-09

## 2023-11-09 PROBLEM — M70.61 GREATER TROCHANTERIC BURSITIS OF RIGHT HIP: Status: ACTIVE | Noted: 2023-11-09

## 2023-11-09 PROBLEM — M35.00 SJOGRENS SYNDROME: Status: ACTIVE | Noted: 2019-08-26

## 2023-11-09 PROBLEM — M79.18 MYOFASCIAL MUSCLE PAIN: Status: ACTIVE | Noted: 2023-09-26

## 2023-11-28 RX ORDER — CYCLOBENZAPRINE HYDROCHLORIDE 5 MG/1
5 TABLET, FILM COATED ORAL
Qty: 14 | Refills: 0 | Status: ACTIVE | COMMUNITY
Start: 2023-09-26 | End: 1900-01-01

## 2024-01-08 ENCOUNTER — APPOINTMENT (OUTPATIENT)
Dept: RHEUMATOLOGY | Facility: CLINIC | Age: 49
End: 2024-01-08

## 2024-03-27 ENCOUNTER — APPOINTMENT (OUTPATIENT)
Dept: OBGYN | Facility: CLINIC | Age: 49
End: 2024-03-27
Payer: COMMERCIAL

## 2024-03-27 VITALS
HEART RATE: 80 BPM | HEIGHT: 63 IN | DIASTOLIC BLOOD PRESSURE: 74 MMHG | BODY MASS INDEX: 24.45 KG/M2 | WEIGHT: 138 LBS | RESPIRATION RATE: 18 BRPM | SYSTOLIC BLOOD PRESSURE: 118 MMHG

## 2024-03-27 DIAGNOSIS — R10.2 PELVIC AND PERINEAL PAIN: ICD-10-CM

## 2024-03-27 DIAGNOSIS — N76.0 ACUTE VAGINITIS: ICD-10-CM

## 2024-03-27 PROCEDURE — 99214 OFFICE O/P EST MOD 30 MIN: CPT

## 2024-03-27 NOTE — PHYSICAL EXAM
[Labia Majora] : labia major [Labia Minora] : labia minora [Normal] : clitoris [FreeTextEntry4] : scant, thin white vaginal discharge

## 2024-03-27 NOTE — CHIEF COMPLAINT
[FreeTextEntry1] : Adriana is a 49 y/o  who presents today with c/o pelvic pain and vaginal pain for the past week. She states it is persitent throughout the day and the pelvic pain radiates to her right side.  She also reports increased vaginal discharge. she is sexually active with one partner. She also has a history of hysterectomy.   She had a colonoscopy in 2017

## 2024-03-27 NOTE — HISTORY OF PRESENT ILLNESS
[unknown] : the patient is unsure of the date of her LMP [Sexually Active] : is sexually active [Monogamous] : is monogamous

## 2024-03-28 LAB
CANDIDA VAG CYTO: NOT DETECTED
G VAGINALIS+PREV SP MTYP VAG QL MICRO: DETECTED
T VAGINALIS VAG QL WET PREP: NOT DETECTED

## 2024-03-28 RX ORDER — METRONIDAZOLE 7.5 MG/G
0.75 GEL VAGINAL
Qty: 1 | Refills: 0 | Status: ACTIVE | COMMUNITY
Start: 2024-03-28 | End: 1900-01-01

## 2024-04-16 DIAGNOSIS — Z12.11 ENCOUNTER FOR SCREENING FOR MALIGNANT NEOPLASM OF COLON: ICD-10-CM

## 2024-04-17 ENCOUNTER — APPOINTMENT (OUTPATIENT)
Dept: OBGYN | Facility: CLINIC | Age: 49
End: 2024-04-17

## 2024-06-24 ENCOUNTER — APPOINTMENT (OUTPATIENT)
Dept: RHEUMATOLOGY | Facility: CLINIC | Age: 49
End: 2024-06-24

## 2024-12-12 ENCOUNTER — NON-APPOINTMENT (OUTPATIENT)
Age: 49
End: 2024-12-12

## 2024-12-12 ENCOUNTER — APPOINTMENT (OUTPATIENT)
Dept: RHEUMATOLOGY | Facility: CLINIC | Age: 49
End: 2024-12-12
Payer: COMMERCIAL

## 2024-12-12 ENCOUNTER — LABORATORY RESULT (OUTPATIENT)
Age: 49
End: 2024-12-12

## 2024-12-12 VITALS
SYSTOLIC BLOOD PRESSURE: 118 MMHG | HEART RATE: 84 BPM | BODY MASS INDEX: 25.16 KG/M2 | OXYGEN SATURATION: 99 % | HEIGHT: 63 IN | DIASTOLIC BLOOD PRESSURE: 78 MMHG | TEMPERATURE: 96.8 F | WEIGHT: 142 LBS

## 2024-12-12 DIAGNOSIS — Z51.81 ENCOUNTER FOR THERAPEUTIC DRUG LVL MONITORING: ICD-10-CM

## 2024-12-12 DIAGNOSIS — M35.00 SICCA SYNDROME, UNSPECIFIED: ICD-10-CM

## 2024-12-12 DIAGNOSIS — M25.542 PAIN IN JOINTS OF RIGHT HAND: ICD-10-CM

## 2024-12-12 DIAGNOSIS — M25.541 PAIN IN JOINTS OF RIGHT HAND: ICD-10-CM

## 2024-12-12 PROCEDURE — G2211 COMPLEX E/M VISIT ADD ON: CPT

## 2024-12-12 PROCEDURE — 99215 OFFICE O/P EST HI 40 MIN: CPT

## 2024-12-12 RX ORDER — METHYLPREDNISOLONE 4 MG/1
4 TABLET ORAL
Qty: 1 | Refills: 1 | Status: ACTIVE | COMMUNITY
Start: 2024-12-12 | End: 1900-01-01

## 2024-12-16 LAB
25(OH)D3 SERPL-MCNC: 36.1 NG/ML
ALBUMIN MFR SERPL ELPH: 66.6 %
ALBUMIN SERPL ELPH-MCNC: 4.5 G/DL
ALBUMIN SERPL-MCNC: 4.5 G/DL
ALBUMIN/GLOB SERPL: 2 RATIO
ALP BLD-CCNC: 73 U/L
ALPHA1 GLOB MFR SERPL ELPH: 3.1 %
ALPHA1 GLOB SERPL ELPH-MCNC: 0.2 G/DL
ALPHA2 GLOB MFR SERPL ELPH: 7.5 %
ALPHA2 GLOB SERPL ELPH-MCNC: 0.5 G/DL
ALT SERPL-CCNC: 28 U/L
ANION GAP SERPL CALC-SCNC: 11 MMOL/L
AST SERPL-CCNC: 24 U/L
B-GLOBULIN MFR SERPL ELPH: 10.7 %
B-GLOBULIN SERPL ELPH-MCNC: 0.7 G/DL
BASOPHILS # BLD AUTO: 0.04 K/UL
BASOPHILS NFR BLD AUTO: 0.6 %
BILIRUB SERPL-MCNC: 0.2 MG/DL
BUN SERPL-MCNC: 11 MG/DL
CALCIUM SERPL-MCNC: 9.3 MG/DL
CHLORIDE SERPL-SCNC: 103 MMOL/L
CO2 SERPL-SCNC: 26 MMOL/L
CREAT SERPL-MCNC: 0.75 MG/DL
CRP SERPL-MCNC: <3 MG/L
DEPRECATED KAPPA LC FREE/LAMBDA SER: 1.04 RATIO
EGFR: 97 ML/MIN/1.73M2
ENA RNP AB SER IA-ACNC: 1.2 AL
ENA SM AB SER IA-ACNC: <0.2 AL
ENA SS-A AB SER IA-ACNC: 1.4 AL
ENA SS-B AB SER IA-ACNC: <0.2 AL
EOSINOPHIL # BLD AUTO: 0.25 K/UL
EOSINOPHIL NFR BLD AUTO: 3.9 %
ERYTHROCYTE [SEDIMENTATION RATE] IN BLOOD BY WESTERGREN METHOD: 4 MM/HR
GAMMA GLOB FLD ELPH-MCNC: 0.8 G/DL
GAMMA GLOB MFR SERPL ELPH: 12.1 %
GLUCOSE SERPL-MCNC: 80 MG/DL
HCT VFR BLD CALC: 38.7 %
HGB BLD-MCNC: 12.1 G/DL
IGA SER QL IEP: 216 MG/DL
IGG SER QL IEP: 841 MG/DL
IGM SER QL IEP: 80 MG/DL
IMM GRANULOCYTES NFR BLD AUTO: 0.2 %
INTERPRETATION SERPL IEP-IMP: NORMAL
KAPPA LC CSF-MCNC: 1.17 MG/DL
KAPPA LC SERPL-MCNC: 1.22 MG/DL
LYMPHOCYTES # BLD AUTO: 2.41 K/UL
LYMPHOCYTES NFR BLD AUTO: 37.9 %
MAN DIFF?: NORMAL
MCHC RBC-ENTMCNC: 28.7 PG
MCHC RBC-ENTMCNC: 31.3 G/DL
MCV RBC AUTO: 91.9 FL
MONOCYTES # BLD AUTO: 0.55 K/UL
MONOCYTES NFR BLD AUTO: 8.6 %
NEUTROPHILS # BLD AUTO: 3.1 K/UL
NEUTROPHILS NFR BLD AUTO: 48.8 %
PLATELET # BLD AUTO: 259 K/UL
POTASSIUM SERPL-SCNC: 4.5 MMOL/L
PROT SERPL-MCNC: 6.8 G/DL
RBC # BLD: 4.21 M/UL
RBC # FLD: 13.1 %
RHEUMATOID FACT SER QL: <10 IU/ML
SODIUM SERPL-SCNC: 139 MMOL/L
TSH SERPL-ACNC: 1.15 UIU/ML
WBC # FLD AUTO: 6.36 K/UL

## 2025-03-17 ENCOUNTER — APPOINTMENT (OUTPATIENT)
Dept: RHEUMATOLOGY | Facility: CLINIC | Age: 50
End: 2025-03-17

## 2025-04-27 NOTE — ED STATDOCS - NS_HEARTSCORE_ED_A_ED_CALC
L side rib fractures s/p mechanical fall yesterday transfer from [place] for trauma eval   takes Eliquis for a fib denies LOC
0

## 2025-05-05 NOTE — ED ADULT NURSE NOTE - IN THE PAST YEAR, HOW OFTEN HAVE YOU USED TOBACCO PRODUCTS?
Health Maintenance       Diabetes Eye Exam (Yearly)  Never done    Diabetes Foot Exam (Yearly)  Never done    Hepatitis A Vaccine (1 of 2 - Risk 2-dose series)  Never done    Shingles Vaccine (1 of 2)  Never done    Hepatitis B Vaccine (For Physician/APC Discussion) (1 of 3 - Risk 3-dose series)  Never done    Respiratory Syncytial Virus (RSV) Vaccine 60+ (1 - Risk 60-74 years 1-dose series)  Never done    Microalbumin Ratio (Yearly)  Order placed this encounter    Diabetes A1C (Every 6 Months)  Order placed this encounter    Medicare Advantage- Medicare Wellness Visit (Yearly - January to December)  Scheduled for 5/5/2025           Following review of the above:  Patient wishes to discuss with clinician: Diabetes A1C, Diabetes Eye Exam, Diabetes Foot Exam, Microalbumin Ratio, COVID-19, Dtap/Tdap/Td, and Respiratory Syncytial Virus (RSV)    Note: Refer to final orders and clinician documentation.         Never